# Patient Record
Sex: MALE | Race: BLACK OR AFRICAN AMERICAN | Employment: OTHER | URBAN - METROPOLITAN AREA
[De-identification: names, ages, dates, MRNs, and addresses within clinical notes are randomized per-mention and may not be internally consistent; named-entity substitution may affect disease eponyms.]

---

## 2024-11-06 ENCOUNTER — APPOINTMENT (OUTPATIENT)
Dept: GENERAL RADIOLOGY | Age: 64
DRG: 482 | End: 2024-11-06
Payer: OTHER GOVERNMENT

## 2024-11-06 ENCOUNTER — ANESTHESIA EVENT (OUTPATIENT)
Dept: OPERATING ROOM | Age: 64
End: 2024-11-06
Payer: OTHER GOVERNMENT

## 2024-11-06 ENCOUNTER — HOSPITAL ENCOUNTER (INPATIENT)
Age: 64
LOS: 5 days | Discharge: LEFT BEFORE DISCHARGE | DRG: 482 | End: 2024-11-11
Attending: EMERGENCY MEDICINE | Admitting: FAMILY MEDICINE
Payer: OTHER GOVERNMENT

## 2024-11-06 DIAGNOSIS — S72.092A: Primary | ICD-10-CM

## 2024-11-06 DIAGNOSIS — S72.092A: ICD-10-CM

## 2024-11-06 LAB
ABO + RH BLD: NORMAL
ANION GAP SERPL CALCULATED.3IONS-SCNC: 13 MMOL/L (ref 7–16)
ARM BAND NUMBER: NORMAL
BLOOD BANK SAMPLE EXPIRATION: NORMAL
BLOOD GROUP ANTIBODIES SERPL: NEGATIVE
BUN SERPL-MCNC: 10 MG/DL (ref 6–23)
CALCIUM SERPL-MCNC: 9.1 MG/DL (ref 8.6–10.2)
CHLORIDE SERPL-SCNC: 102 MMOL/L (ref 98–107)
CO2 SERPL-SCNC: 23 MMOL/L (ref 22–29)
CREAT SERPL-MCNC: 0.8 MG/DL (ref 0.7–1.2)
EKG ATRIAL RATE: 83 BPM
EKG P AXIS: 76 DEGREES
EKG P-R INTERVAL: 196 MS
EKG Q-T INTERVAL: 386 MS
EKG QRS DURATION: 80 MS
EKG QTC CALCULATION (BAZETT): 453 MS
EKG R AXIS: 70 DEGREES
EKG T AXIS: 34 DEGREES
EKG VENTRICULAR RATE: 83 BPM
ERYTHROCYTE [DISTWIDTH] IN BLOOD BY AUTOMATED COUNT: 14.3 % (ref 11.5–15)
GFR, ESTIMATED: >90 ML/MIN/1.73M2
GLUCOSE SERPL-MCNC: 105 MG/DL (ref 74–99)
HCT VFR BLD AUTO: 40.8 % (ref 37–54)
HGB BLD-MCNC: 13.2 G/DL (ref 12.5–16.5)
INR PPP: 1.2
MCH RBC QN AUTO: 27.6 PG (ref 26–35)
MCHC RBC AUTO-ENTMCNC: 32.4 G/DL (ref 32–34.5)
MCV RBC AUTO: 85.2 FL (ref 80–99.9)
PLATELET # BLD AUTO: 219 K/UL (ref 130–450)
PMV BLD AUTO: 10.5 FL (ref 7–12)
POTASSIUM SERPL-SCNC: 4.5 MMOL/L (ref 3.5–5)
PROTHROMBIN TIME: 12.9 SEC (ref 9.3–12.4)
RBC # BLD AUTO: 4.79 M/UL (ref 3.8–5.8)
SODIUM SERPL-SCNC: 138 MMOL/L (ref 132–146)
TROPONIN I SERPL HS-MCNC: 8 NG/L (ref 0–11)
WBC OTHER # BLD: 8.1 K/UL (ref 4.5–11.5)

## 2024-11-06 PROCEDURE — 73502 X-RAY EXAM HIP UNI 2-3 VIEWS: CPT

## 2024-11-06 PROCEDURE — 84484 ASSAY OF TROPONIN QUANT: CPT

## 2024-11-06 PROCEDURE — 80048 BASIC METABOLIC PNL TOTAL CA: CPT

## 2024-11-06 PROCEDURE — 85610 PROTHROMBIN TIME: CPT

## 2024-11-06 PROCEDURE — 96374 THER/PROPH/DIAG INJ IV PUSH: CPT

## 2024-11-06 PROCEDURE — 85027 COMPLETE CBC AUTOMATED: CPT

## 2024-11-06 PROCEDURE — 6360000002 HC RX W HCPCS: Performed by: EMERGENCY MEDICINE

## 2024-11-06 PROCEDURE — 86850 RBC ANTIBODY SCREEN: CPT

## 2024-11-06 PROCEDURE — 1200000000 HC SEMI PRIVATE

## 2024-11-06 PROCEDURE — 93010 ELECTROCARDIOGRAM REPORT: CPT | Performed by: INTERNAL MEDICINE

## 2024-11-06 PROCEDURE — 93005 ELECTROCARDIOGRAM TRACING: CPT | Performed by: EMERGENCY MEDICINE

## 2024-11-06 PROCEDURE — 36415 COLL VENOUS BLD VENIPUNCTURE: CPT

## 2024-11-06 PROCEDURE — 86901 BLOOD TYPING SEROLOGIC RH(D): CPT

## 2024-11-06 PROCEDURE — 99285 EMERGENCY DEPT VISIT HI MDM: CPT

## 2024-11-06 PROCEDURE — 6370000000 HC RX 637 (ALT 250 FOR IP): Performed by: EMERGENCY MEDICINE

## 2024-11-06 PROCEDURE — 6360000002 HC RX W HCPCS: Performed by: FAMILY MEDICINE

## 2024-11-06 PROCEDURE — 86900 BLOOD TYPING SEROLOGIC ABO: CPT

## 2024-11-06 PROCEDURE — 71045 X-RAY EXAM CHEST 1 VIEW: CPT

## 2024-11-06 PROCEDURE — 2580000003 HC RX 258: Performed by: FAMILY MEDICINE

## 2024-11-06 RX ORDER — POTASSIUM CHLORIDE 1500 MG/1
40 TABLET, EXTENDED RELEASE ORAL PRN
Status: ACTIVE | OUTPATIENT
Start: 2024-11-06 | End: 2024-11-08

## 2024-11-06 RX ORDER — SENNOSIDES A AND B 8.6 MG/1
1 TABLET, FILM COATED ORAL DAILY PRN
Status: DISCONTINUED | OUTPATIENT
Start: 2024-11-06 | End: 2024-11-11 | Stop reason: HOSPADM

## 2024-11-06 RX ORDER — FENTANYL CITRATE 0.05 MG/ML
25 INJECTION, SOLUTION INTRAMUSCULAR; INTRAVENOUS ONCE
Status: COMPLETED | OUTPATIENT
Start: 2024-11-06 | End: 2024-11-06

## 2024-11-06 RX ORDER — SODIUM CHLORIDE 0.9 % (FLUSH) 0.9 %
5-40 SYRINGE (ML) INJECTION EVERY 12 HOURS SCHEDULED
Status: DISCONTINUED | OUTPATIENT
Start: 2024-11-06 | End: 2024-11-11 | Stop reason: HOSPADM

## 2024-11-06 RX ORDER — ONDANSETRON 2 MG/ML
4 INJECTION INTRAMUSCULAR; INTRAVENOUS EVERY 6 HOURS PRN
Status: DISCONTINUED | OUTPATIENT
Start: 2024-11-06 | End: 2024-11-11 | Stop reason: HOSPADM

## 2024-11-06 RX ORDER — MAGNESIUM SULFATE IN WATER 40 MG/ML
2000 INJECTION, SOLUTION INTRAVENOUS PRN
Status: DISCONTINUED | OUTPATIENT
Start: 2024-11-06 | End: 2024-11-11 | Stop reason: HOSPADM

## 2024-11-06 RX ORDER — SODIUM CHLORIDE, SODIUM LACTATE, POTASSIUM CHLORIDE, CALCIUM CHLORIDE 600; 310; 30; 20 MG/100ML; MG/100ML; MG/100ML; MG/100ML
INJECTION, SOLUTION INTRAVENOUS CONTINUOUS
Status: DISCONTINUED | OUTPATIENT
Start: 2024-11-06 | End: 2024-11-07

## 2024-11-06 RX ORDER — PROMETHAZINE HYDROCHLORIDE 25 MG/1
12.5 TABLET ORAL EVERY 6 HOURS PRN
Status: DISCONTINUED | OUTPATIENT
Start: 2024-11-06 | End: 2024-11-11 | Stop reason: HOSPADM

## 2024-11-06 RX ORDER — POLYETHYLENE GLYCOL 3350 17 G/17G
17 POWDER, FOR SOLUTION ORAL DAILY PRN
Status: DISCONTINUED | OUTPATIENT
Start: 2024-11-06 | End: 2024-11-11 | Stop reason: HOSPADM

## 2024-11-06 RX ORDER — SODIUM CHLORIDE 0.9 % (FLUSH) 0.9 %
5-40 SYRINGE (ML) INJECTION PRN
Status: DISCONTINUED | OUTPATIENT
Start: 2024-11-06 | End: 2024-11-11 | Stop reason: HOSPADM

## 2024-11-06 RX ORDER — HYDROCODONE BITARTRATE AND ACETAMINOPHEN 5; 325 MG/1; MG/1
1 TABLET ORAL ONCE
Status: COMPLETED | OUTPATIENT
Start: 2024-11-06 | End: 2024-11-06

## 2024-11-06 RX ORDER — HYDROMORPHONE HYDROCHLORIDE 1 MG/ML
1 INJECTION, SOLUTION INTRAMUSCULAR; INTRAVENOUS; SUBCUTANEOUS
Status: DISCONTINUED | OUTPATIENT
Start: 2024-11-06 | End: 2024-11-07

## 2024-11-06 RX ORDER — POTASSIUM CHLORIDE 7.45 MG/ML
10 INJECTION INTRAVENOUS PRN
Status: ACTIVE | OUTPATIENT
Start: 2024-11-06 | End: 2024-11-08

## 2024-11-06 RX ORDER — IPRATROPIUM BROMIDE AND ALBUTEROL SULFATE 2.5; .5 MG/3ML; MG/3ML
1 SOLUTION RESPIRATORY (INHALATION) EVERY 4 HOURS PRN
Status: DISCONTINUED | OUTPATIENT
Start: 2024-11-06 | End: 2024-11-11 | Stop reason: HOSPADM

## 2024-11-06 RX ORDER — SODIUM CHLORIDE 9 MG/ML
INJECTION, SOLUTION INTRAVENOUS PRN
Status: DISCONTINUED | OUTPATIENT
Start: 2024-11-06 | End: 2024-11-11 | Stop reason: HOSPADM

## 2024-11-06 RX ORDER — MORPHINE SULFATE 2 MG/ML
2 INJECTION, SOLUTION INTRAMUSCULAR; INTRAVENOUS
Status: DISCONTINUED | OUTPATIENT
Start: 2024-11-06 | End: 2024-11-11 | Stop reason: HOSPADM

## 2024-11-06 RX ADMIN — HYDROCODONE BITARTRATE AND ACETAMINOPHEN 1 TABLET: 5; 325 TABLET ORAL at 13:10

## 2024-11-06 RX ADMIN — HYDROMORPHONE HYDROCHLORIDE 1 MG: 1 INJECTION, SOLUTION INTRAMUSCULAR; INTRAVENOUS; SUBCUTANEOUS at 17:35

## 2024-11-06 RX ADMIN — FENTANYL CITRATE 25 MCG: 0.05 INJECTION, SOLUTION INTRAMUSCULAR; INTRAVENOUS at 14:47

## 2024-11-06 RX ADMIN — SODIUM CHLORIDE, PRESERVATIVE FREE 10 ML: 5 INJECTION INTRAVENOUS at 22:41

## 2024-11-06 RX ADMIN — MORPHINE SULFATE 2 MG: 2 INJECTION, SOLUTION INTRAMUSCULAR; INTRAVENOUS at 16:04

## 2024-11-06 RX ADMIN — SODIUM CHLORIDE, POTASSIUM CHLORIDE, SODIUM LACTATE AND CALCIUM CHLORIDE: 600; 310; 30; 20 INJECTION, SOLUTION INTRAVENOUS at 16:04

## 2024-11-06 ASSESSMENT — PAIN DESCRIPTION - DESCRIPTORS
DESCRIPTORS: SHARP
DESCRIPTORS: ACHING;SHARP

## 2024-11-06 ASSESSMENT — PAIN SCALES - GENERAL
PAINLEVEL_OUTOF10: 10

## 2024-11-06 ASSESSMENT — PAIN DESCRIPTION - ORIENTATION
ORIENTATION: LEFT
ORIENTATION: LEFT

## 2024-11-06 ASSESSMENT — LIFESTYLE VARIABLES
HOW OFTEN DO YOU HAVE A DRINK CONTAINING ALCOHOL: NEVER
HOW MANY STANDARD DRINKS CONTAINING ALCOHOL DO YOU HAVE ON A TYPICAL DAY: PATIENT DOES NOT DRINK

## 2024-11-06 ASSESSMENT — PAIN DESCRIPTION - LOCATION: LOCATION: HIP

## 2024-11-06 NOTE — ED PROVIDER NOTES
Patient presents with complaint of left hip pain after falling from a ladder.  He states was about 10 ft up and the ladder slipped.  He rode it down and fell onto a wood deck.  He denies striking his head.  No LOC.  He denies use of anticoagulation, bleeding or clotting disorder.  He denies CP, SOB or abdominal pain.    The history is provided by the patient.       Review of Systems   Constitutional:  Positive for activity change.   HENT:  Negative for facial swelling.    Respiratory:  Negative for shortness of breath.    Cardiovascular:  Negative for chest pain.   Gastrointestinal:  Negative for abdominal pain and nausea.   Genitourinary:  Negative for flank pain.   Musculoskeletal:  Positive for arthralgias and gait problem. Negative for back pain, myalgias and neck pain.   Skin: Negative.    Neurological:  Negative for dizziness, syncope, light-headedness, numbness and headaches.   Hematological:  Does not bruise/bleed easily.       Physical Exam  Vitals and nursing note reviewed.   Constitutional:       General: He is not in acute distress.     Appearance: Normal appearance. He is well-developed and normal weight. He is not ill-appearing, toxic-appearing or diaphoretic.   HENT:      Head: Normocephalic and atraumatic.      Nose: Nose normal.      Mouth/Throat:      Mouth: Mucous membranes are moist.      Pharynx: Oropharynx is clear.   Eyes:      Extraocular Movements: Extraocular movements intact.      Conjunctiva/sclera: Conjunctivae normal.      Pupils: Pupils are equal, round, and reactive to light.   Cardiovascular:      Rate and Rhythm: Normal rate and regular rhythm.      Pulses: Normal pulses.      Heart sounds: Normal heart sounds. No murmur heard.  Pulmonary:      Effort: Pulmonary effort is normal. No respiratory distress.      Breath sounds: Normal breath sounds. No wheezing or rales.   Abdominal:      General: Bowel sounds are normal.      Palpations: Abdomen is soft.      Tenderness: There is no

## 2024-11-06 NOTE — CONSULTS
thigh, hip.    Pelvis: -TTP, -Log roll, -Heel strike     DATA:    CBC:   Lab Results   Component Value Date/Time    WBC 8.1 11/06/2024 02:10 PM    RBC 4.79 11/06/2024 02:10 PM    HGB 13.2 11/06/2024 02:10 PM    HCT 40.8 11/06/2024 02:10 PM    MCV 85.2 11/06/2024 02:10 PM    MCH 27.6 11/06/2024 02:10 PM    MCHC 32.4 11/06/2024 02:10 PM    RDW 14.3 11/06/2024 02:10 PM     11/06/2024 02:10 PM    MPV 10.5 11/06/2024 02:10 PM       Radiology Review:  XR left hip and pelvis 11/6/2024  Radiographs of left hip and pelvis demonstrating left comminuted intertrochanteric hip fracture with possible basicervical femoral neck fracture.  Will appear shortened and externally rotated.  Mild apex anterior angulation without varus or valgus deformity.  No other fractures or dislocations.    IMPRESSION:  Left comminuted intertrochanteric hip fracture    PLAN:  Patient admitted to medicine service  Pain control per admitting team  We will plan for open reduction internal fixation of left intertrochanteric hip fracture on 11/7/2024  Patient be n.p.o. at midnight 11/7/2024  Preoperative antibiotics Ancef 11/7/2024  Appreciate medical optimization from medical team  Discussed risk and benefits of open reduction internal fixation procedure with patient.  All patient's questions were sought and answered and patient is currently agreeable to plan  All patient/family questions have been answered and patient is currently agreeable to plan.  Discuss with Attending Dr. Mix    I was present with the resident during the history and exam. I discussed the case with the resident and agree with the findings and plan as documented in the resident's note.     Electronically signed by Murali Trivedi DO on 11/6/2024 at 3:21 PM    
XRAY VIEWS LEFT HIP 11/6/2024 1:28 pm COMPARISON: None. HISTORY: ORDERING SYSTEM PROVIDED HISTORY: fall TECHNOLOGIST PROVIDED HISTORY: Reason for exam:->fall FINDINGS: There is comminuted intertrochanteric fracture.  Adjacent soft tissue swelling noted.  There are no radiopaque foreign bodies.     Comminuted left intertrochanteric fracture.         NOTE: This report, in part or full, may have been transcribed using voice recognition software. Every effort was made to ensure accuracy; however, inadvertent computerized transcription errors may be present. Please excuse any transcriptional grammatical or spelling errors that may have escaped my editorial review.    Physician Signature: Electronically signed by Dr. Juarez  [phone] (p)  11/7/2024  2:51 PM

## 2024-11-06 NOTE — H&P
History & Physicial  Taras Benson  [id number]  1960 11/06/24  Primary Care:  No primary care provider on file.  No primary physician on file.        Chief Complaint   Patient presents with    Fall     Fell off ladder 30mins PTA while cleaning gutters, landed on left side, denies hitting head, denies LOC, denies thinners, reports left hip/leg pain       HPI:  Patient is a 64-year-old male who presents from home to the ER after falling off a ladder while cleaning the gutters and landing on his left side.  He denies hitting his head, denies loss of consciousness.  He is not on any blood thinners or any chronic medications and does not follow with a primary doctor regularly.  He had immediate pain in his left hip and was unable to ambulate.  Glucose 105.  Otherwise metabolic panel negative.  CBC unremarkable.  INR 1.2.  Type and screen was ordered.  X-ray of the left hip showed a comminuted left intertrochanteric fracture.  EKG showed normal sinus rhythm.  Consult was placed to the surgical team for trauma as well as orthopedic surgery for the left hip fracture.  The patient was admitted for further workup, evaluation, and management.    Prior to Visit Medications    Not on File     Social History     Tobacco Use    Smoking status: Every Day     Types: Cigars    Smokeless tobacco: Never   Vaping Use    Vaping status: Never Used   Substance Use Topics    Alcohol use: Not Currently     History reviewed. No pertinent family history.  History reviewed. No pertinent surgical history.  History reviewed. No pertinent past medical history.    Review of Systems   Constitutional: Negative.    HENT: Negative.     Eyes: Negative.    Respiratory: Negative.     Cardiovascular: Negative.    Gastrointestinal: Negative.    Endocrine: Negative.    Musculoskeletal:  Positive for arthralgias.        Left hip pain   Skin: Negative.    Allergic/Immunologic: Negative.    Neurological: Negative.    Hematological: Negative.

## 2024-11-07 ENCOUNTER — ANESTHESIA (OUTPATIENT)
Dept: OPERATING ROOM | Age: 64
End: 2024-11-07
Payer: OTHER GOVERNMENT

## 2024-11-07 ENCOUNTER — APPOINTMENT (OUTPATIENT)
Dept: GENERAL RADIOLOGY | Age: 64
DRG: 482 | End: 2024-11-07
Payer: OTHER GOVERNMENT
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
## 2024-11-07 LAB — PARTIAL THROMBOPLASTIN TIME: 31.9 SEC (ref 24.5–35.1)

## 2024-11-07 PROCEDURE — 85730 THROMBOPLASTIN TIME PARTIAL: CPT

## 2024-11-07 PROCEDURE — 1200000000 HC SEMI PRIVATE

## 2024-11-07 PROCEDURE — 3600000004 HC SURGERY LEVEL 4 BASE: Performed by: ORTHOPAEDIC SURGERY

## 2024-11-07 PROCEDURE — 3700000000 HC ANESTHESIA ATTENDED CARE: Performed by: ORTHOPAEDIC SURGERY

## 2024-11-07 PROCEDURE — 0QS704Z REPOSITION LEFT UPPER FEMUR WITH INTERNAL FIXATION DEVICE, OPEN APPROACH: ICD-10-PCS | Performed by: ORTHOPAEDIC SURGERY

## 2024-11-07 PROCEDURE — 3700000001 HC ADD 15 MINUTES (ANESTHESIA): Performed by: ORTHOPAEDIC SURGERY

## 2024-11-07 PROCEDURE — 6360000002 HC RX W HCPCS: Performed by: ANESTHESIOLOGY

## 2024-11-07 PROCEDURE — 36415 COLL VENOUS BLD VENIPUNCTURE: CPT

## 2024-11-07 PROCEDURE — 7100000000 HC PACU RECOVERY - FIRST 15 MIN: Performed by: ORTHOPAEDIC SURGERY

## 2024-11-07 PROCEDURE — C1713 ANCHOR/SCREW BN/BN,TIS/BN: HCPCS | Performed by: ORTHOPAEDIC SURGERY

## 2024-11-07 PROCEDURE — 2500000003 HC RX 250 WO HCPCS

## 2024-11-07 PROCEDURE — 7100000001 HC PACU RECOVERY - ADDTL 15 MIN: Performed by: ORTHOPAEDIC SURGERY

## 2024-11-07 PROCEDURE — 2580000003 HC RX 258

## 2024-11-07 PROCEDURE — 3600000014 HC SURGERY LEVEL 4 ADDTL 15MIN: Performed by: ORTHOPAEDIC SURGERY

## 2024-11-07 PROCEDURE — 6360000002 HC RX W HCPCS

## 2024-11-07 PROCEDURE — C1769 GUIDE WIRE: HCPCS | Performed by: ORTHOPAEDIC SURGERY

## 2024-11-07 PROCEDURE — 2709999900 HC NON-CHARGEABLE SUPPLY: Performed by: ORTHOPAEDIC SURGERY

## 2024-11-07 PROCEDURE — 2720000010 HC SURG SUPPLY STERILE: Performed by: ORTHOPAEDIC SURGERY

## 2024-11-07 PROCEDURE — 6370000000 HC RX 637 (ALT 250 FOR IP): Performed by: INTERNAL MEDICINE

## 2024-11-07 PROCEDURE — 6360000002 HC RX W HCPCS: Performed by: INTERNAL MEDICINE

## 2024-11-07 DEVICE — LONG NAIL, LEFT
Type: IMPLANTABLE DEVICE | Site: HIP | Status: FUNCTIONAL
Brand: GAMMA

## 2024-11-07 DEVICE — LOCKING SCREW
Type: IMPLANTABLE DEVICE | Site: HIP | Status: FUNCTIONAL
Brand: T2 ALPHA

## 2024-11-07 DEVICE — LAG SCREW
Type: IMPLANTABLE DEVICE | Site: HIP | Status: FUNCTIONAL
Brand: GAMMA

## 2024-11-07 RX ORDER — PHENYLEPHRINE HCL IN 0.9% NACL 1 MG/10 ML
SYRINGE (ML) INTRAVENOUS
Status: DISCONTINUED | OUTPATIENT
Start: 2024-11-07 | End: 2024-11-07 | Stop reason: SDUPTHER

## 2024-11-07 RX ORDER — ASPIRIN 325 MG
325 TABLET ORAL 2 TIMES DAILY
Qty: 56 TABLET | Refills: 0 | Status: SHIPPED | OUTPATIENT
Start: 2024-11-07 | End: 2024-12-05

## 2024-11-07 RX ORDER — ENOXAPARIN SODIUM 100 MG/ML
40 INJECTION SUBCUTANEOUS DAILY
Status: DISCONTINUED | OUTPATIENT
Start: 2024-11-08 | End: 2024-11-11 | Stop reason: HOSPADM

## 2024-11-07 RX ORDER — SODIUM CHLORIDE 9 MG/ML
INJECTION, SOLUTION INTRAVENOUS PRN
Status: DISCONTINUED | OUTPATIENT
Start: 2024-11-07 | End: 2024-11-07 | Stop reason: HOSPADM

## 2024-11-07 RX ORDER — OXYCODONE AND ACETAMINOPHEN 5; 325 MG/1; MG/1
1 TABLET ORAL EVERY 6 HOURS PRN
Qty: 28 TABLET | Refills: 0 | Status: SHIPPED | OUTPATIENT
Start: 2024-11-07 | End: 2024-11-14

## 2024-11-07 RX ORDER — DEXMEDETOMIDINE HYDROCHLORIDE 100 UG/ML
INJECTION, SOLUTION INTRAVENOUS
Status: DISCONTINUED | OUTPATIENT
Start: 2024-11-07 | End: 2024-11-07 | Stop reason: SDUPTHER

## 2024-11-07 RX ORDER — ACETAMINOPHEN 500 MG
1000 TABLET ORAL EVERY 6 HOURS SCHEDULED
Status: DISPENSED | OUTPATIENT
Start: 2024-11-07 | End: 2024-11-09

## 2024-11-07 RX ORDER — MIDAZOLAM HYDROCHLORIDE 1 MG/ML
2 INJECTION, SOLUTION INTRAMUSCULAR; INTRAVENOUS
Status: DISCONTINUED | OUTPATIENT
Start: 2024-11-07 | End: 2024-11-07 | Stop reason: HOSPADM

## 2024-11-07 RX ORDER — FENTANYL CITRATE 50 UG/ML
INJECTION, SOLUTION INTRAMUSCULAR; INTRAVENOUS
Status: COMPLETED | OUTPATIENT
Start: 2024-11-07 | End: 2024-11-07

## 2024-11-07 RX ORDER — MIDAZOLAM HYDROCHLORIDE 1 MG/ML
INJECTION, SOLUTION INTRAMUSCULAR; INTRAVENOUS
Status: DISCONTINUED | OUTPATIENT
Start: 2024-11-07 | End: 2024-11-07 | Stop reason: SDUPTHER

## 2024-11-07 RX ORDER — LABETALOL HYDROCHLORIDE 5 MG/ML
10 INJECTION, SOLUTION INTRAVENOUS
Status: DISCONTINUED | OUTPATIENT
Start: 2024-11-07 | End: 2024-11-07 | Stop reason: HOSPADM

## 2024-11-07 RX ORDER — PHENYLEPHRINE HYDROCHLORIDE 10 MG/ML
INJECTION INTRAVENOUS
Status: DISCONTINUED | OUTPATIENT
Start: 2024-11-07 | End: 2024-11-07

## 2024-11-07 RX ORDER — PROCHLORPERAZINE EDISYLATE 5 MG/ML
5 INJECTION INTRAMUSCULAR; INTRAVENOUS
Status: DISCONTINUED | OUTPATIENT
Start: 2024-11-07 | End: 2024-11-07 | Stop reason: HOSPADM

## 2024-11-07 RX ORDER — SODIUM CHLORIDE 0.9 % (FLUSH) 0.9 %
5-40 SYRINGE (ML) INJECTION PRN
Status: DISCONTINUED | OUTPATIENT
Start: 2024-11-07 | End: 2024-11-07 | Stop reason: HOSPADM

## 2024-11-07 RX ORDER — BUPIVACAINE HYDROCHLORIDE 7.5 MG/ML
INJECTION, SOLUTION INTRASPINAL
Status: COMPLETED | OUTPATIENT
Start: 2024-11-07 | End: 2024-11-07

## 2024-11-07 RX ORDER — FENTANYL CITRATE 0.05 MG/ML
25 INJECTION, SOLUTION INTRAMUSCULAR; INTRAVENOUS EVERY 5 MIN PRN
Status: DISCONTINUED | OUTPATIENT
Start: 2024-11-07 | End: 2024-11-07 | Stop reason: HOSPADM

## 2024-11-07 RX ORDER — KETOROLAC TROMETHAMINE 30 MG/ML
30 INJECTION, SOLUTION INTRAMUSCULAR; INTRAVENOUS ONCE
Status: COMPLETED | OUTPATIENT
Start: 2024-11-07 | End: 2024-11-07

## 2024-11-07 RX ORDER — KETOROLAC TROMETHAMINE 30 MG/ML
30 INJECTION, SOLUTION INTRAMUSCULAR; INTRAVENOUS EVERY 6 HOURS PRN
Status: DISPENSED | OUTPATIENT
Start: 2024-11-07 | End: 2024-11-09

## 2024-11-07 RX ORDER — KETOROLAC TROMETHAMINE 30 MG/ML
INJECTION, SOLUTION INTRAMUSCULAR; INTRAVENOUS
Status: COMPLETED
Start: 2024-11-07 | End: 2024-11-07

## 2024-11-07 RX ORDER — SODIUM CHLORIDE 0.9 % (FLUSH) 0.9 %
5-40 SYRINGE (ML) INJECTION EVERY 12 HOURS SCHEDULED
Status: DISCONTINUED | OUTPATIENT
Start: 2024-11-07 | End: 2024-11-07 | Stop reason: HOSPADM

## 2024-11-07 RX ORDER — DROPERIDOL 2.5 MG/ML
0.62 INJECTION, SOLUTION INTRAMUSCULAR; INTRAVENOUS
Status: DISCONTINUED | OUTPATIENT
Start: 2024-11-07 | End: 2024-11-07 | Stop reason: HOSPADM

## 2024-11-07 RX ORDER — DIPHENHYDRAMINE HYDROCHLORIDE 50 MG/ML
12.5 INJECTION INTRAMUSCULAR; INTRAVENOUS
Status: DISCONTINUED | OUTPATIENT
Start: 2024-11-07 | End: 2024-11-07 | Stop reason: HOSPADM

## 2024-11-07 RX ORDER — HYDRALAZINE HYDROCHLORIDE 20 MG/ML
10 INJECTION INTRAMUSCULAR; INTRAVENOUS
Status: DISCONTINUED | OUTPATIENT
Start: 2024-11-07 | End: 2024-11-07 | Stop reason: HOSPADM

## 2024-11-07 RX ORDER — MEPERIDINE HYDROCHLORIDE 25 MG/ML
12.5 INJECTION INTRAMUSCULAR; INTRAVENOUS; SUBCUTANEOUS EVERY 5 MIN PRN
Status: DISCONTINUED | OUTPATIENT
Start: 2024-11-07 | End: 2024-11-07 | Stop reason: HOSPADM

## 2024-11-07 RX ORDER — ASPIRIN 325 MG
325 TABLET, DELAYED RELEASE (ENTERIC COATED) ORAL 2 TIMES DAILY
Status: DISCONTINUED | OUTPATIENT
Start: 2024-11-08 | End: 2024-11-11 | Stop reason: HOSPADM

## 2024-11-07 RX ORDER — PROPOFOL 10 MG/ML
INJECTION, EMULSION INTRAVENOUS
Status: DISCONTINUED | OUTPATIENT
Start: 2024-11-07 | End: 2024-11-07 | Stop reason: SDUPTHER

## 2024-11-07 RX ORDER — IPRATROPIUM BROMIDE AND ALBUTEROL SULFATE 2.5; .5 MG/3ML; MG/3ML
1 SOLUTION RESPIRATORY (INHALATION)
Status: DISCONTINUED | OUTPATIENT
Start: 2024-11-07 | End: 2024-11-07 | Stop reason: HOSPADM

## 2024-11-07 RX ORDER — NALOXONE HYDROCHLORIDE 0.4 MG/ML
INJECTION, SOLUTION INTRAMUSCULAR; INTRAVENOUS; SUBCUTANEOUS PRN
Status: DISCONTINUED | OUTPATIENT
Start: 2024-11-07 | End: 2024-11-07 | Stop reason: HOSPADM

## 2024-11-07 RX ADMIN — CEFAZOLIN 2000 MG: 2 INJECTION, POWDER, FOR SOLUTION INTRAMUSCULAR; INTRAVENOUS at 13:44

## 2024-11-07 RX ADMIN — DEXMEDETOMIDINE HYDROCHLORIDE 6 MCG: 100 INJECTION, SOLUTION INTRAVENOUS at 13:48

## 2024-11-07 RX ADMIN — DEXMEDETOMIDINE HYDROCHLORIDE 6 MCG: 100 INJECTION, SOLUTION INTRAVENOUS at 13:30

## 2024-11-07 RX ADMIN — PROPOFOL INJECTABLE EMULSION 80 MCG/KG/MIN: 10 INJECTION, EMULSION INTRAVENOUS at 13:30

## 2024-11-07 RX ADMIN — KETOROLAC TROMETHAMINE 30 MG: 30 INJECTION, SOLUTION INTRAMUSCULAR at 22:08

## 2024-11-07 RX ADMIN — Medication 100 MCG: at 14:10

## 2024-11-07 RX ADMIN — KETOROLAC TROMETHAMINE 30 MG: 30 INJECTION, SOLUTION INTRAMUSCULAR at 16:20

## 2024-11-07 RX ADMIN — MIDAZOLAM 2 MG: 1 INJECTION INTRAMUSCULAR; INTRAVENOUS at 13:28

## 2024-11-07 RX ADMIN — FENTANYL CITRATE 25 MCG: 50 INJECTION, SOLUTION INTRAMUSCULAR; INTRAVENOUS at 13:42

## 2024-11-07 RX ADMIN — FENTANYL CITRATE 25 MCG: 50 INJECTION, SOLUTION INTRAMUSCULAR; INTRAVENOUS at 13:36

## 2024-11-07 RX ADMIN — Medication 100 MCG: at 13:58

## 2024-11-07 RX ADMIN — PHENYLEPHRINE HYDROCHLORIDE 100 MCG/MIN: 50 INJECTION INTRAVENOUS at 14:22

## 2024-11-07 RX ADMIN — Medication 100 MCG: at 14:24

## 2024-11-07 RX ADMIN — ACETAMINOPHEN 1000 MG: 500 TABLET ORAL at 18:46

## 2024-11-07 RX ADMIN — PROPOFOL INJECTABLE EMULSION 40 MG: 10 INJECTION, EMULSION INTRAVENOUS at 14:18

## 2024-11-07 RX ADMIN — Medication 100 MCG: at 14:03

## 2024-11-07 RX ADMIN — Medication 100 MCG: at 14:21

## 2024-11-07 RX ADMIN — Medication 300 MCG: at 14:12

## 2024-11-07 RX ADMIN — Medication 200 MCG: at 14:31

## 2024-11-07 RX ADMIN — BUPIVACAINE HYDROCHLORIDE IN DEXTROSE 12 MG: 7.5 INJECTION, SOLUTION SUBARACHNOID at 13:42

## 2024-11-07 RX ADMIN — FENTANYL CITRATE 50 MCG: 50 INJECTION, SOLUTION INTRAMUSCULAR; INTRAVENOUS at 13:30

## 2024-11-07 RX ADMIN — Medication 100 MCG: at 14:08

## 2024-11-07 RX ADMIN — KETOROLAC TROMETHAMINE 30 MG: 30 INJECTION, SOLUTION INTRAMUSCULAR; INTRAVENOUS at 16:20

## 2024-11-07 RX ADMIN — CEFAZOLIN 2000 MG: 2 INJECTION, POWDER, FOR SOLUTION INTRAMUSCULAR; INTRAVENOUS at 20:15

## 2024-11-07 ASSESSMENT — PAIN DESCRIPTION - DESCRIPTORS
DESCRIPTORS: ACHING
DESCRIPTORS: ACHING;DISCOMFORT;SORE
DESCRIPTORS: ACHING;DISCOMFORT;SORE

## 2024-11-07 ASSESSMENT — LIFESTYLE VARIABLES: SMOKING_STATUS: 1

## 2024-11-07 ASSESSMENT — PAIN DESCRIPTION - PAIN TYPE
TYPE: SURGICAL PAIN
TYPE: SURGICAL PAIN

## 2024-11-07 ASSESSMENT — PAIN DESCRIPTION - FREQUENCY
FREQUENCY: CONTINUOUS
FREQUENCY: CONTINUOUS

## 2024-11-07 ASSESSMENT — PAIN DESCRIPTION - LOCATION
LOCATION: HIP
LOCATION: NECK
LOCATION: HIP

## 2024-11-07 ASSESSMENT — PAIN - FUNCTIONAL ASSESSMENT
PAIN_FUNCTIONAL_ASSESSMENT: PREVENTS OR INTERFERES SOME ACTIVE ACTIVITIES AND ADLS
PAIN_FUNCTIONAL_ASSESSMENT: ACTIVITIES ARE NOT PREVENTED
PAIN_FUNCTIONAL_ASSESSMENT: NONE - DENIES PAIN
PAIN_FUNCTIONAL_ASSESSMENT: PREVENTS OR INTERFERES SOME ACTIVE ACTIVITIES AND ADLS
PAIN_FUNCTIONAL_ASSESSMENT: NONE - DENIES PAIN

## 2024-11-07 ASSESSMENT — PAIN SCALES - GENERAL
PAINLEVEL_OUTOF10: 5
PAINLEVEL_OUTOF10: 7
PAINLEVEL_OUTOF10: 2

## 2024-11-07 ASSESSMENT — PAIN DESCRIPTION - ONSET
ONSET: ON-GOING
ONSET: ON-GOING

## 2024-11-07 ASSESSMENT — PAIN DESCRIPTION - ORIENTATION
ORIENTATION: LEFT
ORIENTATION: LEFT
ORIENTATION: RIGHT

## 2024-11-07 NOTE — OP NOTE
Operative Note      Patient: Taras Benson  YOB: 1960  MRN: [id number]    Date of Procedure: 11/7/2024    Pre-Op Diagnosis Codes:      * Closed intertrochanteric fracture of hip, left, initial encounter ([facility]) [S72.142A]    Post-Op Diagnosis: Same       Procedure(s):  OPEN REDUCTION INTERNAL FIXATION LEFT INTERTROCHANTERIC HIP FRACTURE    Surgeon(s):  Eron Mix DO    Assistant:   First Assistant: Maico Figueroa  Resident: Murali Trivedi DO; Win Rosa DO    Anesthesia: Spinal    Estimated Blood Loss (mL): less than 100     Complications: None    Specimens:   * No specimens in log *    Implants:  Implant Name Type Inv. Item Serial No.  Lot No. LRB No. Used Action   NAIL  DEG L 420 MM DIA12 MM LNG TI ALLOY LT HIP STRL - [id number]  NAIL  DEG L 420 MM DIA12 MM LNG TI ALLOY LT HIP STRL  [employer] DFM [id number] Left 1 Implanted   SCREW LK 5X45MM - [id number]  SCREW LK 5X45MM  [employer] Oy-WD [id number] Left 1 Implanted         Drains: * No LDAs found *    Findings:  Infection Present At Time Of Surgery (PATOS) (choose all levels that have infection present):  No infection present  Other Findings: as above    Detailed Description of Procedure:   Below    OPERATIVE COURSE:      The patient was brought into the operating room in a supine position on a hospital room bed. The patient underwent spinal anesthesia.  He was then transferred to the fracture table, by multiple individuals, in a safe fashion with anesthesia in control of the patient's cervical spine and airway. Once on the fracture table, a peroneal post was placed. All points of pressure were identified and well padded and legs were placed in well-padded boots.     After being positioned, a time out was performed indicating the appropriate identification of the patient, the procedure to be performed, and the side to be performed upon. This was agreed upon by all individuals in the room.

## 2024-11-07 NOTE — PLAN OF CARE
Problem: Discharge Planning  Goal: Discharge to home or other facility with appropriate resources  Recent Flowsheet Documentation  Taken 11/6/2024 1549 by Jodie Mock, RN  Discharge to home or other facility with appropriate resources: Identify barriers to discharge with patient and caregiver  11/6/2024 1547 by Jodie Mock, RN  Outcome: Completed     Problem: Safety - Adult  Goal: Free from fall injury  11/6/2024 1547 by Jodie Mock, RN  Outcome: Completed     Problem: ABCDS Injury Assessment  Goal: Absence of physical injury  11/6/2024 1547 by Jodie Mock, RN  Outcome: Completed

## 2024-11-07 NOTE — ANESTHESIA PROCEDURE NOTES
Spinal Block    Patient location during procedure: OR  End time: 11/7/2024 1:44 PM  Reason for block: primary anesthetic and at surgeon's request  Staffing  Performed: other anesthesia staff   Anesthesiologist: Rudy Machuca MD  Resident/CRNA: Fidencio Erickson APRN - CRNA  Other anesthesia staff: Shanika Mendoza RN  Performed by: Fidencio Erickson APRN - CRNA  Authorized by: Rudy Machuca MD    Spinal Block  Patient position: left lateral decubitus  Prep: ChloraPrep  Patient monitoring: continuous pulse ox, frequent blood pressure checks, cardiac monitor, continuous capnometry and oxygen  Approach: midline  Location: L3/L4  Provider prep: mask and sterile gloves  Local infiltration: lidocaine  Needle  Needle type: Pencan   Needle gauge: 25 G  Needle length: 3.5 in  Assessment  Swirl obtained: Yes  CSF: clear  Attempts: 1  Hemodynamics: stable  Preanesthetic Checklist  Completed: patient identified, IV checked, site marked, risks and benefits discussed, surgical/procedural consents, equipment checked, pre-op evaluation, timeout performed, anesthesia consent given, oxygen available and monitors applied/VS acknowledged

## 2024-11-07 NOTE — CARE COORDINATION
11/7/2024: Pt presents from home with a left hip fracture, unable to meet with pt, currently in OR, sw/cm to follow post-op for PT/OT evals for transition of care planning needs.Electronically signed by BERE Sibley on 11/7/2024 at 1:30 PM

## 2024-11-07 NOTE — ANESTHESIA PRE PROCEDURE
ECG     Anesthesia Plan      spinal     ASA 2       Induction: intravenous.    MIPS: Prophylactic antiemetics administered.  Anesthetic plan and risks discussed with patient.    Use of blood products discussed with patient whom consented to blood products.    Plan discussed with CRNA.            General anesthesia discussed as back up.        Shanika Mendoza RN   11/7/2024

## 2024-11-07 NOTE — DISCHARGE INSTRUCTIONS
Your information:  Name: Taras Benson  : 1960    Your instructions:  Discharge home against medical advice  [facility] so we will not know until tomorrow if there is an accepting agency for you to receive physical therapy at home  Continue weight bearing as tolerated with your walker  Dressing to be removed on postoperative day 7 and then dry sterile dressing changes until drainage ceases    Signs and symptoms to watch out for:   Call 911 anytime you think you may need emergency care. For example, call if:    You passed out (lost consciousness).     You have severe trouble breathing.     You have sudden chest pain and shortness of breath, or you cough up blood.   Call your doctor now or seek immediate medical care if:    You have pain that does not get better after you take pain medicine.     Your fingers or toes on the injured arm or leg are cool, pale, or change color.     You have tingling or numbness in your fingers or toes.     You cannot move your fingers or toes.     Your cast or splint feels too tight.     The skin under your cast or splint is burning or stinging.     You have signs of infection, such as:  Increased pain, swelling, warmth, or redness.  Red streaks leading from the incision.  Pus draining from the incision.  A fever.     You have drainage or a bad smell coming from the cast or splint.     You have signs of a blood clot, such as:  Pain in your calf, back of the knee, thigh, or groin.  Swelling in the leg or groin.  A color change on the leg or groin. The skin may be reddish or purplish, depending on your usual skin color.     You have new or worse nausea or vomiting.     You are too sick to your stomach to drink any fluids.     You cannot keep down fluids.   Watch closely for any changes in your health, and be sure to contact your doctor if:    You have any problems with your cast or splint.     What to do after you leave the hospital:    Recommended diet: regular diet    The

## 2024-11-07 NOTE — ANESTHESIA POSTPROCEDURE EVALUATION
Department of Anesthesiology  Postprocedure Note    Patient: Taras Benson  MRN: [id number]  YOB: 1960  Date of evaluation: 11/7/2024    Procedure Summary       Date: 11/07/24 Room / Location: [address]    Anesthesia Start: 1304 Anesthesia Stop: 1534    Procedure: OPEN REDUCTION INTERNAL FIXATION LEFT INTERTROCHANTERIC HIP FRACTURE (Left: Hip) Diagnosis:       Closed intertrochanteric fracture of hip, left, initial encounter ([facility])      (Closed intertrochanteric fracture of hip, left, initial encounter ([facility]) [S72.142A])    Surgeons: Eron Mix DO Responsible Provider: Rudy Machuca MD    Anesthesia Type: MAC ASA Status: 2            Anesthesia Type: MAC    Cammie Phase I: Cammie Score: 10    Acmmie Phase II:      Anesthesia Post Evaluation    Patient location during evaluation: PACU  Patient participation: complete - patient participated  Level of consciousness: awake  Airway patency: patent  Nausea & Vomiting: no nausea and no vomiting  Cardiovascular status: hemodynamically stable  Respiratory status: acceptable  Hydration status: euvolemic  Pain management: adequate    No notable events documented.

## 2024-11-08 LAB
ANION GAP SERPL CALCULATED.3IONS-SCNC: 11 MMOL/L (ref 7–16)
BUN SERPL-MCNC: 16 MG/DL (ref 6–23)
CALCIUM SERPL-MCNC: 8.5 MG/DL (ref 8.6–10.2)
CHLORIDE SERPL-SCNC: 101 MMOL/L (ref 98–107)
CO2 SERPL-SCNC: 24 MMOL/L (ref 22–29)
CREAT SERPL-MCNC: 0.8 MG/DL (ref 0.7–1.2)
GFR, ESTIMATED: >90 ML/MIN/1.73M2
GLUCOSE SERPL-MCNC: 115 MG/DL (ref 74–99)
HCT VFR BLD AUTO: 32.2 % (ref 37–54)
HGB BLD-MCNC: 10.9 G/DL (ref 12.5–16.5)
POTASSIUM SERPL-SCNC: 3.9 MMOL/L (ref 3.5–5)
SODIUM SERPL-SCNC: 136 MMOL/L (ref 132–146)

## 2024-11-08 PROCEDURE — 97530 THERAPEUTIC ACTIVITIES: CPT

## 2024-11-08 PROCEDURE — 6360000002 HC RX W HCPCS: Performed by: INTERNAL MEDICINE

## 2024-11-08 PROCEDURE — 1200000000 HC SEMI PRIVATE

## 2024-11-08 PROCEDURE — 97161 PT EVAL LOW COMPLEX 20 MIN: CPT | Performed by: PHYSICAL THERAPIST

## 2024-11-08 PROCEDURE — 97110 THERAPEUTIC EXERCISES: CPT | Performed by: PHYSICAL THERAPIST

## 2024-11-08 PROCEDURE — 2580000003 HC RX 258

## 2024-11-08 PROCEDURE — 36415 COLL VENOUS BLD VENIPUNCTURE: CPT

## 2024-11-08 PROCEDURE — 6370000000 HC RX 637 (ALT 250 FOR IP): Performed by: INTERNAL MEDICINE

## 2024-11-08 PROCEDURE — 97530 THERAPEUTIC ACTIVITIES: CPT | Performed by: PHYSICAL THERAPIST

## 2024-11-08 PROCEDURE — 85014 HEMATOCRIT: CPT

## 2024-11-08 PROCEDURE — 6360000002 HC RX W HCPCS

## 2024-11-08 PROCEDURE — 97165 OT EVAL LOW COMPLEX 30 MIN: CPT

## 2024-11-08 PROCEDURE — 80048 BASIC METABOLIC PNL TOTAL CA: CPT

## 2024-11-08 PROCEDURE — 6370000000 HC RX 637 (ALT 250 FOR IP)

## 2024-11-08 PROCEDURE — 85018 HEMOGLOBIN: CPT

## 2024-11-08 RX ADMIN — ACETAMINOPHEN 1000 MG: 500 TABLET ORAL at 00:18

## 2024-11-08 RX ADMIN — KETOROLAC TROMETHAMINE 30 MG: 30 INJECTION, SOLUTION INTRAMUSCULAR at 15:36

## 2024-11-08 RX ADMIN — SODIUM CHLORIDE, PRESERVATIVE FREE 10 ML: 5 INJECTION INTRAVENOUS at 20:31

## 2024-11-08 RX ADMIN — CEFAZOLIN 2000 MG: 2 INJECTION, POWDER, FOR SOLUTION INTRAMUSCULAR; INTRAVENOUS at 06:12

## 2024-11-08 RX ADMIN — ASPIRIN 325 MG: 325 TABLET, COATED ORAL at 20:31

## 2024-11-08 RX ADMIN — ASPIRIN 325 MG: 325 TABLET, COATED ORAL at 08:47

## 2024-11-08 RX ADMIN — ACETAMINOPHEN 1000 MG: 500 TABLET ORAL at 12:02

## 2024-11-08 RX ADMIN — ACETAMINOPHEN 1000 MG: 500 TABLET ORAL at 06:12

## 2024-11-08 RX ADMIN — KETOROLAC TROMETHAMINE 30 MG: 30 INJECTION, SOLUTION INTRAMUSCULAR at 08:46

## 2024-11-08 RX ADMIN — ENOXAPARIN SODIUM 40 MG: 100 INJECTION SUBCUTANEOUS at 08:46

## 2024-11-08 RX ADMIN — SODIUM CHLORIDE, PRESERVATIVE FREE 10 ML: 5 INJECTION INTRAVENOUS at 08:47

## 2024-11-08 RX ADMIN — ACETAMINOPHEN 1000 MG: 500 TABLET ORAL at 17:31

## 2024-11-08 ASSESSMENT — PAIN DESCRIPTION - ORIENTATION: ORIENTATION: LEFT

## 2024-11-08 ASSESSMENT — PAIN DESCRIPTION - LOCATION: LOCATION: HIP

## 2024-11-08 ASSESSMENT — PAIN SCALES - GENERAL: PAINLEVEL_OUTOF10: 5

## 2024-11-08 NOTE — PLAN OF CARE
Problem: Safety - Adult  Goal: Free from fall injury  Outcome: Progressing     Problem: Pain  Goal: Verbalizes/displays adequate comfort level or baseline comfort level  Outcome: Progressing     Problem: Neurosensory - Adult  Goal: Achieves stable or improved neurological status  Outcome: Progressing  Goal: Achieves maximal functionality and self care  Outcome: Progressing     Problem: Respiratory - Adult  Goal: Achieves optimal ventilation and oxygenation  Outcome: Progressing     Problem: Skin/Tissue Integrity - Adult  Goal: Skin integrity remains intact  Outcome: Progressing  Goal: Incisions, wounds, or drain sites healing without S/S of infection  Outcome: Progressing     Problem: Musculoskeletal - Adult  Goal: Return mobility to safest level of function  Outcome: Progressing  Goal: Maintain proper alignment of affected body part  Outcome: Progressing  Goal: Return ADL status to a safe level of function  Outcome: Progressing

## 2024-11-08 NOTE — CARE COORDINATION
Case Management Assessment  Initial Evaluation    Date/Time of Evaluation: 11/8/2024 4:09 PM  Assessment Completed by: BERE Gonzalez    If patient is discharged prior to next notation, then this note serves as note for discharge by case management.    Patient Name: Taras Benson                   YOB: 1960  Diagnosis: Comminuted fracture of hip, left, closed, initial encounter ([facility]) [S72.092A]  Closed comminuted fracture of hip, left, initial encounter ([facility]) [S72.092A]                   Date / Time: 11/6/2024  1:01 PM    Patient Admission Status: Inpatient   Readmission Risk (Low < 19, Mod (19-27), High > 27): Readmission Risk Score: 5.4    Current PCP: No primary care provider on file.  PCP verified by CM? Yes    Chart Reviewed: Yes      History Provided by: Patient  Patient Orientation: Alert and Oriented    Patient Cognition: Alert    Hospitalization in the last 30 days (Readmission):  No    If yes, Readmission Assessment in CM Navigator will be completed.    Advance Directives:      Code Status: Full Code   Patient's Primary Decision Maker is: Legal Next of Kin    Primary Decision Maker: Edgardo Benson - Child - [phone]    Discharge Planning:    Patient lives with: Alone Type of Home: House  Primary Care Giver: Self  Patient Support Systems include: Family Members, Children   Current Financial resources:    Current community resources:    [facility]: [facility]            Current DME:              Type of Home Care services:  PT, OT    ADLS  Prior functional level: Independent in ADLs/IADLs  Current functional level: Assistance with the following:    PT AM-PAC: 10 /24  OT AM-PAC: 13 /24    Family can provide assistance at DC: No  Would you like Case Management to discuss the discharge plan with any other family members/significant others, and if so, who? No  Plans to Return to Present Housing: Unknown at present  Other Identified Issues/Barriers to RETURNING to

## 2024-11-08 NOTE — ACP (ADVANCE CARE PLANNING)
Advance Care Planning   Healthcare Decision Maker:    Primary Decision Maker: Edgardo Benson - [facility] - [phone]      Today we documented Decision Maker(s) consistent with Legal Next of Kin hierarchy.

## 2024-11-09 PROCEDURE — 97116 GAIT TRAINING THERAPY: CPT

## 2024-11-09 PROCEDURE — 1200000000 HC SEMI PRIVATE

## 2024-11-09 PROCEDURE — 6370000000 HC RX 637 (ALT 250 FOR IP): Performed by: FAMILY MEDICINE

## 2024-11-09 PROCEDURE — 97530 THERAPEUTIC ACTIVITIES: CPT

## 2024-11-09 PROCEDURE — 6360000002 HC RX W HCPCS: Performed by: INTERNAL MEDICINE

## 2024-11-09 PROCEDURE — 6370000000 HC RX 637 (ALT 250 FOR IP): Performed by: INTERNAL MEDICINE

## 2024-11-09 PROCEDURE — 97110 THERAPEUTIC EXERCISES: CPT

## 2024-11-09 PROCEDURE — 2580000003 HC RX 258

## 2024-11-09 PROCEDURE — 6370000000 HC RX 637 (ALT 250 FOR IP)

## 2024-11-09 RX ORDER — IBUPROFEN 400 MG/1
400 TABLET, FILM COATED ORAL EVERY 6 HOURS PRN
Status: DISCONTINUED | OUTPATIENT
Start: 2024-11-09 | End: 2024-11-11 | Stop reason: HOSPADM

## 2024-11-09 RX ADMIN — SODIUM CHLORIDE, PRESERVATIVE FREE 10 ML: 5 INJECTION INTRAVENOUS at 21:15

## 2024-11-09 RX ADMIN — IBUPROFEN 400 MG: 400 TABLET, FILM COATED ORAL at 21:37

## 2024-11-09 RX ADMIN — KETOROLAC TROMETHAMINE 30 MG: 30 INJECTION, SOLUTION INTRAMUSCULAR at 09:59

## 2024-11-09 RX ADMIN — ACETAMINOPHEN 1000 MG: 500 TABLET ORAL at 12:15

## 2024-11-09 RX ADMIN — ENOXAPARIN SODIUM 40 MG: 100 INJECTION SUBCUTANEOUS at 09:37

## 2024-11-09 RX ADMIN — ACETAMINOPHEN 1000 MG: 500 TABLET ORAL at 01:00

## 2024-11-09 ASSESSMENT — PAIN DESCRIPTION - DESCRIPTORS
DESCRIPTORS: ACHING
DESCRIPTORS: ACHING
DESCRIPTORS: ACHING;JABBING;NAGGING
DESCRIPTORS: ACHING

## 2024-11-09 ASSESSMENT — PAIN DESCRIPTION - LOCATION
LOCATION: HIP

## 2024-11-09 ASSESSMENT — PAIN DESCRIPTION - ORIENTATION
ORIENTATION: LEFT

## 2024-11-09 ASSESSMENT — PAIN SCALES - GENERAL
PAINLEVEL_OUTOF10: 5
PAINLEVEL_OUTOF10: 4
PAINLEVEL_OUTOF10: 7
PAINLEVEL_OUTOF10: 3
PAINLEVEL_OUTOF10: 5
PAINLEVEL_OUTOF10: 5
PAINLEVEL_OUTOF10: 4

## 2024-11-10 PROCEDURE — 97530 THERAPEUTIC ACTIVITIES: CPT

## 2024-11-10 PROCEDURE — 2580000003 HC RX 258

## 2024-11-10 PROCEDURE — 6360000002 HC RX W HCPCS: Performed by: INTERNAL MEDICINE

## 2024-11-10 PROCEDURE — 97116 GAIT TRAINING THERAPY: CPT

## 2024-11-10 PROCEDURE — 1200000000 HC SEMI PRIVATE

## 2024-11-10 PROCEDURE — 6370000000 HC RX 637 (ALT 250 FOR IP): Performed by: FAMILY MEDICINE

## 2024-11-10 RX ADMIN — IBUPROFEN 400 MG: 400 TABLET, FILM COATED ORAL at 11:36

## 2024-11-10 RX ADMIN — IBUPROFEN 400 MG: 400 TABLET, FILM COATED ORAL at 20:29

## 2024-11-10 RX ADMIN — IBUPROFEN 400 MG: 400 TABLET, FILM COATED ORAL at 06:12

## 2024-11-10 RX ADMIN — ENOXAPARIN SODIUM 40 MG: 100 INJECTION SUBCUTANEOUS at 08:12

## 2024-11-10 RX ADMIN — SODIUM CHLORIDE, PRESERVATIVE FREE 10 ML: 5 INJECTION INTRAVENOUS at 20:30

## 2024-11-10 RX ADMIN — SODIUM CHLORIDE, PRESERVATIVE FREE 10 ML: 5 INJECTION INTRAVENOUS at 08:12

## 2024-11-10 ASSESSMENT — PAIN SCALES - GENERAL
PAINLEVEL_OUTOF10: 5
PAINLEVEL_OUTOF10: 3
PAINLEVEL_OUTOF10: 2
PAINLEVEL_OUTOF10: 6
PAINLEVEL_OUTOF10: 6

## 2024-11-10 ASSESSMENT — PAIN DESCRIPTION - ORIENTATION
ORIENTATION: LEFT

## 2024-11-10 ASSESSMENT — PAIN DESCRIPTION - LOCATION
LOCATION: HIP

## 2024-11-10 ASSESSMENT — PAIN DESCRIPTION - DESCRIPTORS
DESCRIPTORS: ACHING
DESCRIPTORS: ACHING;DULL;JABBING

## 2024-11-10 NOTE — PLAN OF CARE
Problem: Safety - Adult  Goal: Free from fall injury  Outcome: Progressing     Problem: Pain  Goal: Verbalizes/displays adequate comfort level or baseline comfort level  Outcome: Progressing     Problem: Neurosensory - Adult  Goal: Achieves stable or improved neurological status  Outcome: Progressing     Problem: Neurosensory - Adult  Goal: Achieves maximal functionality and self care  Outcome: Progressing     Problem: Respiratory - Adult  Goal: Achieves optimal ventilation and oxygenation  Outcome: Progressing     Problem: Skin/Tissue Integrity - Adult  Goal: Skin integrity remains intact  Outcome: Progressing     Problem: Skin/Tissue Integrity - Adult  Goal: Incisions, wounds, or drain sites healing without S/S of infection  Outcome: Progressing     Problem: Musculoskeletal - Adult  Goal: Return mobility to safest level of function  Outcome: Progressing     Problem: Musculoskeletal - Adult  Goal: Maintain proper alignment of affected body part  Outcome: Progressing     Problem: Musculoskeletal - Adult  Goal: Return ADL status to a safe level of function  Outcome: Progressing     Problem: Genitourinary - Adult  Goal: Absence of urinary retention  Outcome: Progressing

## 2024-11-11 VITALS
HEIGHT: 72 IN | RESPIRATION RATE: 17 BRPM | SYSTOLIC BLOOD PRESSURE: 112 MMHG | HEART RATE: 93 BPM | BODY MASS INDEX: 22.21 KG/M2 | OXYGEN SATURATION: 95 % | WEIGHT: 164 LBS | DIASTOLIC BLOOD PRESSURE: 65 MMHG | TEMPERATURE: 98.2 F

## 2024-11-11 PROCEDURE — 6370000000 HC RX 637 (ALT 250 FOR IP)

## 2024-11-11 PROCEDURE — 97530 THERAPEUTIC ACTIVITIES: CPT

## 2024-11-11 PROCEDURE — 6360000002 HC RX W HCPCS: Performed by: INTERNAL MEDICINE

## 2024-11-11 PROCEDURE — 2580000003 HC RX 258

## 2024-11-11 PROCEDURE — 99239 HOSP IP/OBS DSCHRG MGMT >30: CPT | Performed by: INTERNAL MEDICINE

## 2024-11-11 PROCEDURE — 97110 THERAPEUTIC EXERCISES: CPT

## 2024-11-11 PROCEDURE — 97535 SELF CARE MNGMENT TRAINING: CPT

## 2024-11-11 PROCEDURE — APPSS30 APP SPLIT SHARED TIME 16-30 MINUTES: Performed by: NURSE PRACTITIONER

## 2024-11-11 PROCEDURE — 6370000000 HC RX 637 (ALT 250 FOR IP): Performed by: FAMILY MEDICINE

## 2024-11-11 RX ADMIN — ENOXAPARIN SODIUM 40 MG: 100 INJECTION SUBCUTANEOUS at 08:15

## 2024-11-11 RX ADMIN — POLYETHYLENE GLYCOL 3350 17 G: 17 POWDER, FOR SOLUTION ORAL at 08:15

## 2024-11-11 RX ADMIN — SODIUM CHLORIDE, PRESERVATIVE FREE 10 ML: 5 INJECTION INTRAVENOUS at 08:15

## 2024-11-11 RX ADMIN — IBUPROFEN 400 MG: 400 TABLET, FILM COATED ORAL at 08:14

## 2024-11-11 ASSESSMENT — PAIN DESCRIPTION - LOCATION: LOCATION: LEG

## 2024-11-11 ASSESSMENT — PAIN DESCRIPTION - DESCRIPTORS: DESCRIPTORS: ACHING;SHARP

## 2024-11-11 ASSESSMENT — PAIN DESCRIPTION - ORIENTATION: ORIENTATION: LEFT

## 2024-11-11 ASSESSMENT — PAIN SCALES - GENERAL: PAINLEVEL_OUTOF10: 5

## 2024-11-11 NOTE — PLAN OF CARE
Problem: Safety - Adult  Goal: Free from fall injury  Outcome: Progressing     Problem: Pain  Goal: Verbalizes/displays adequate comfort level or baseline comfort level  Outcome: Progressing     Problem: Neurosensory - Adult  Goal: Achieves stable or improved neurological status  Outcome: Progressing  Goal: Achieves maximal functionality and self care  Outcome: Progressing     Problem: Respiratory - Adult  Goal: Achieves optimal ventilation and oxygenation  Outcome: Progressing     Problem: Skin/Tissue Integrity - Adult  Goal: Skin integrity remains intact  Outcome: Progressing  Goal: Incisions, wounds, or drain sites healing without S/S of infection  Outcome: Progressing     Problem: Musculoskeletal - Adult  Goal: Return mobility to safest level of function  Outcome: Progressing  Goal: Maintain proper alignment of affected body part  Outcome: Progressing  Goal: Return ADL status to a safe level of function  Outcome: Progressing     Problem: Genitourinary - Adult  Goal: Absence of urinary retention  Outcome: Progressing

## 2024-11-11 NOTE — CARE COORDINATION
11/11/2024 210p (sf) SS NOTE: SW notified by NP Karoline that pt is now refusing to transfer to rehab and states he is discharging home today. Pt's son Edgardo came to nursing desk and notified nursing & SW that pt is adamant he is leaving today. Per Edgardo, pt resides in a [address] home with bathroom near his room. SW discussed arranging DME (WW & ETB) as well as HHC. Pt's son reports they have access to a walker from a family member, therefore, they do not want to wait for the DME. SW placed call to LAURA Diez at the [facility] to attempt to arrange HHC, however, due to [facility], the [facility] office [facility] closed. Message was left, however, this SW unable to arrange any post care services. SW notified nursing and NP Karoline. Per Karoline, d/t not having a safe dc plan, they will NOT DC THE PT. She is aware pt has signed out AMA.     Electronically signed by BERE Gonzalez on 11/11/2024 at 2:18 PM

## 2024-11-11 NOTE — PLAN OF CARE
Problem: Safety - Adult  Goal: Free from fall injury  11/11/2024 0732 by Jodie Mock, RN  Outcome: Progressing  11/10/2024 2133 by Bethany Parekh, RN  Outcome: Progressing

## 2024-11-11 NOTE — PLAN OF CARE
Problem: Safety - Adult  Goal: Free from fall injury  11/11/2024 1400 by Jodie Mock, RN  Outcome: Completed  11/11/2024 0732 by Jodie Mock, RN  Outcome: Progressing

## 2024-11-11 NOTE — PROGRESS NOTES
[facility] Hospitalist   Progress Note    Admitting Date and Time: 11/6/2024  1:01 PM  Admit Dx: Comminuted fracture of hip, left, closed, initial encounter ([facility]) [S72.092A]  Closed comminuted fracture of hip, left, initial encounter ([facility]) [S72.092A]    Subjective/interval history:    11/7: Pt admitted yesterday with closed comminuted fracture of the left hip after mechanical fall.  He does not have any known significant medical history.    Patient had ORIF today without immediate complications.  He notes he is having pain, but did not like the feeling of narcotic analgesics after surgery.    11/8: Patient is sitting up in chair.  He worked with physical therapy today and needed a significant amount of assistance.  At this point he would be appropriate for subacute rehab, however he is hoping to go home with home care.    11/9: Patient feels better today, notes the swelling in his left leg has gone down.  Pain is adequately controlled with as needed Toradol.  Slight drop in hemoglobin, this is expected after surgery.  No signs of active bleeding at this time.    ROS: denies fever, chills, cp, sob, n/v, HA unless stated above.     aspirin  325 mg Oral BID    acetaminophen  1,000 mg Oral 4 times per day    enoxaparin  40 mg SubCUTAneous Daily    sodium chloride flush  5-40 mL IntraVENous 2 times per day     ketorolac, 30 mg, Q6H PRN  sodium chloride flush, 5-40 mL, PRN  sodium chloride, , PRN  magnesium sulfate, 2,000 mg, PRN  polyethylene glycol, 17 g, Daily PRN  senna, 1 tablet, Daily PRN  promethazine, 12.5 mg, Q6H PRN   Or  ondansetron, 4 mg, Q6H PRN  morphine, 2 mg, Q3H PRN  ipratropium 0.5 mg-albuterol 2.5 mg, 1 Dose, Q4H PRN         Objective:    /63   Pulse 90   Temp 98.2 °F (36.8 °C) (Oral)   Resp 16   Ht 1.828 m (5' 11.97\")   Wt 74.4 kg (164 lb)   SpO2 97%   BMI 22.26 kg/m²   General Appearance: Awake, alert, oriented x 3, not in any distress  Skin: warm and dry  Head: 
       [facility] Hospitalist   Progress Note    Admitting Date and Time: 11/6/2024  1:01 PM  Admit Dx: Comminuted fracture of hip, left, closed, initial encounter ([facility]) [S72.092A]  Closed comminuted fracture of hip, left, initial encounter ([facility]) [S72.092A]    Subjective/interval history:    11/7: Pt admitted yesterday with closed comminuted fracture of the left hip after mechanical fall.  He does not have any known significant medical history.    Patient had ORIF today without immediate complications.  He notes he is having pain, but did not like the feeling of narcotic analgesics after surgery.    11/8: Patient is sitting up in chair.  He worked with physical therapy today and needed a significant amount of assistance.  At this point he would be appropriate for subacute rehab, however he is hoping to go home with home care.    ROS: denies fever, chills, cp, sob, n/v, HA unless stated above.     aspirin  325 mg Oral BID    acetaminophen  1,000 mg Oral 4 times per day    enoxaparin  40 mg SubCUTAneous Daily    sodium chloride flush  5-40 mL IntraVENous 2 times per day     ketorolac, 30 mg, Q6H PRN  sodium chloride flush, 5-40 mL, PRN  sodium chloride, , PRN  potassium chloride, 40 mEq, PRN   Or  potassium alternative oral replacement, 40 mEq, PRN   Or  potassium chloride, 10 mEq, PRN  magnesium sulfate, 2,000 mg, PRN  polyethylene glycol, 17 g, Daily PRN  senna, 1 tablet, Daily PRN  promethazine, 12.5 mg, Q6H PRN   Or  ondansetron, 4 mg, Q6H PRN  morphine, 2 mg, Q3H PRN  ipratropium 0.5 mg-albuterol 2.5 mg, 1 Dose, Q4H PRN         Objective:    BP (!) 107/56   Pulse (!) 105   Temp 98.4 °F (36.9 °C) (Oral)   Resp 18   Ht 1.828 m (5' 11.97\")   Wt 74.4 kg (164 lb)   SpO2 96%   BMI 22.26 kg/m²   General Appearance: Awake, alert, oriented x 3, not in any distress  Skin: warm and dry  Head: normocephalic and atraumatic  Eyes: pupils equal, round, and reactive to light, extraocular eye movements intact, 
       [facility] Hospitalist   Progress Note    Admitting Date and Time: 11/6/2024  1:01 PM  Admit Dx: Comminuted fracture of hip, left, closed, initial encounter ([facility]) [S72.092A]  Closed comminuted fracture of hip, left, initial encounter ([facility]) [S72.092A]    Subjective/interval history:    Pt admitted yesterday with closed comminuted fracture of the left hip after mechanical fall.  He does not have any known significant medical history.    Patient had ORIF today without immediate complications.  He notes he is having pain, but did not like the feeling of narcotic analgesics after surgery.    ROS: denies fever, chills, cp, sob, n/v, HA unless stated above.     sodium chloride flush  5-40 mL IntraVENous 2 times per day    ketorolac  30 mg IntraVENous Once    HYDROmorphone        ketorolac        sodium chloride flush  5-40 mL IntraVENous 2 times per day     naloxone 0.4 mg in 10 mL sodium chloride syringe, , PRN  sodium chloride flush, 5-40 mL, PRN  sodium chloride, , PRN  meperidine, 12.5 mg, Q5 Min PRN  fentanNYL, 25 mcg, Q5 Min PRN  HYDROmorphone, 0.5 mg, Q5 Min PRN  prochlorperazine, 5 mg, Once PRN  droPERidol, 0.625 mg, Once PRN  midazolam, 2 mg, Once PRN  diphenhydrAMINE, 12.5 mg, Once PRN  labetalol, 10 mg, Q15 Min PRN   Or  hydrALAZINE, 10 mg, Q15 Min PRN  ipratropium 0.5 mg-albuterol 2.5 mg, 1 Dose, Once PRN  HYDROmorphone, ,   ketorolac, ,   sodium chloride flush, 5-40 mL, PRN  sodium chloride, , PRN  potassium chloride, 40 mEq, PRN   Or  potassium alternative oral replacement, 40 mEq, PRN   Or  potassium chloride, 10 mEq, PRN  magnesium sulfate, 2,000 mg, PRN  polyethylene glycol, 17 g, Daily PRN  senna, 1 tablet, Daily PRN  promethazine, 12.5 mg, Q6H PRN   Or  ondansetron, 4 mg, Q6H PRN  HYDROmorphone, 1 mg, Q3H PRN  morphine, 2 mg, Q3H PRN  ipratropium 0.5 mg-albuterol 2.5 mg, 1 Dose, Q4H PRN         Objective:    BP (!) 107/57   Pulse 84   Temp 97.6 °F (36.4 °C) (Infrared)   Resp 17   Ht 
4 Eyes Skin Assessment     NAME:  Taras Benson  YOB: 1960  MEDICAL RECORD NUMBER:  [id number]    The patient is being assessed for  Admission    I agree that at least one RN has performed a thorough Head to Toe Skin Assessment on the patient. ALL assessment sites listed below have been assessed.      Areas assessed by both nurses:    Head, Face, Ears, Shoulders, Back, Chest, Arms, Elbows, Hands, Sacrum. Buttock, Coccyx, Ischium, Legs. Feet and Heels, and Under Medical Devices         Does the Patient have a Wound? No noted wound(s)       Matthew Prevention initiated by RN: Yes  Wound Care Orders initiated by RN: No    Pressure Injury (Stage 3,4, Unstageable, DTI, NWPT, and Complex wounds) if present, place Wound referral order by RN under : No    New Ostomies, if present place, Ostomy referral order under : No     Nurse 1 eSignature: Electronically signed by Jodie Tello RN on 11/6/24 at 4:32 PM EST    **SHARE this note so that the co-signing nurse can place an eSignature**    Nurse 2 eSignature: Electronically signed by Jodie Rojas RN on 11/6/24 at 4:33 PM EST   
Department of Orthopedic Surgery  Resident Progress Note    Patient seen and examined at bedside this morning.  His pain continues to improve and he states that is generally well-controlled and that he is anxious to go home.  Patient ambulated 2 x 18 feet with use of a walker, but was unable to navigate stairs.  He denies any new onset numbness, tingling, or paresthesias.  He denies any other orthopedic complaints this time.      VITALS:  /65   Pulse 93   Temp 98.2 °F (36.8 °C) (Oral)   Resp 17   Ht 1.828 m (5' 11.97\")   Wt 74.4 kg (164 lb)   SpO2 95%   BMI 22.26 kg/m²     General: Alert and oriented x 3    MUSCULOSKELETAL:   left lower extremity:  Dressings are clean dry and intact  Compartments are soft and compressible  Sensation remains intact to sural, saphenous, tibial, peroneal nerve distributions  Motor intact ankle dorsiflexion, plantarflexion, EHL  Distal extremities warm and well-perfused  Palpable DP pulse      CBC:   Lab Results   Component Value Date/Time    WBC 8.1 11/06/2024 02:10 PM    HGB 10.9 11/08/2024 04:59 AM    HCT 32.2 11/08/2024 04:59 AM     11/06/2024 02:10 PM     PT/INR:    Lab Results   Component Value Date/Time    PROTIME 12.9 11/06/2024 03:50 PM    INR 1.2 11/06/2024 03:50 PM       ASSESSMENT  S/P reduction internal fixation of left intertrochanteric hip fracture 11/7/2024    PLAN    Continue weightbearing as tolerated to left lower extremity  Continue PT/OT  DVT prophylaxis per medicine while in house, aspirin 325 twice daily on discharge  Multimodal pain control wean as able  Per physical therapy recommendations, will be reassessed for ability to navigate stairs.  Patient is unable to navigate stairs, will discussion about discharge to short-term rehab facility.  From orthopedic perspective, he is stable for discharge.  All patient questions were sought and answered this time and patient is currently agreeable to plan.      Murali Trivedi DO, PGY1  Orthopaedic 
Department of Orthopedic Surgery  Resident Progress Note    Patient seen and examined at bedside this morning.  His pain continues to improve and he states that is generally well-controlled and that he is anxious to go home.  Patient was able to ambulate 5 sidesteps with use of wheeled walker with physical therapy yesterday.  He denies any new onset numbness, tingling, or paresthesias.  He denies any other orthopedic complaints this time.      VITALS:  /64   Pulse 93   Temp 99 °F (37.2 °C) (Oral)   Resp 14   Ht 1.828 m (5' 11.97\")   Wt 74.4 kg (164 lb)   SpO2 93%   BMI 22.26 kg/m²     General: Alert and oriented x 3    MUSCULOSKELETAL:   left lower extremity:  Dressings are clean dry and intact  Compartments are soft and compressible  Sensation remains intact to sural, saphenous, tibial, peroneal nerve distributions  Motor intact ankle dorsiflexion, plantarflexion, EHL  Distal extremities warm and well-perfused  Palpable DP pulse      CBC:   Lab Results   Component Value Date/Time    WBC 8.1 11/06/2024 02:10 PM    HGB 10.9 11/08/2024 04:59 AM    HCT 32.2 11/08/2024 04:59 AM     11/06/2024 02:10 PM     PT/INR:    Lab Results   Component Value Date/Time    PROTIME 12.9 11/06/2024 03:50 PM    INR 1.2 11/06/2024 03:50 PM       ASSESSMENT  S/P reduction internal fixation of left intertrochanteric hip fracture 11/7/2024    PLAN    Continue weightbearing as tolerated to left lower extremity  Continue PT/OT  DVT prophylaxis per medicine while in house, aspirin 325 twice daily on discharge  Multimodal pain control wean as able  Patient would like to be discharged home, patient is stable for discharge from orthopedic perspective as long as physical therapy believes he is safe for discharge      Murali Trivedi DO, PGY1  Orthopaedic Surgery  
Department of Orthopedic Surgery  Resident Progress Note    Patient seen and examined.  Patient states that he still has postoperative pain in his left lower extremity but his pain is still improved relative to before.  He denies any numbness, ting, or paresthesias.  He denies any new orthopedic complaints this time.      VITALS:  /63   Pulse 90   Temp 98.2 °F (36.8 °C) (Oral)   Resp 16   Ht 1.828 m (5' 11.97\")   Wt 74.4 kg (164 lb)   SpO2 97%   BMI 22.26 kg/m²     General: Alert and oriented x 3    MUSCULOSKELETAL:   left lower extremity:  Dressings clean dry and intact  Apartments are soft compressible  Sensation intact sural, saphenous, tibial, peroneal nerve distributions  Motor intact to ankle dorsiflexion, plantarflexion, EHL  Capillary refill <3 seconds  Distal extremities warm well-perfused      CBC:   Lab Results   Component Value Date/Time    WBC 8.1 11/06/2024 02:10 PM    HGB 10.9 11/08/2024 04:59 AM    HCT 32.2 11/08/2024 04:59 AM     11/06/2024 02:10 PM     PT/INR:    Lab Results   Component Value Date/Time    PROTIME 12.9 11/06/2024 03:50 PM    INR 1.2 11/06/2024 03:50 PM       ASSESSMENT  S/P open reduction internal fixation of left intertrochanteric hip fracture 11/7/2024    PLAN    Continue weightbearing as tolerated left lower extremity  Continue PT/OT  DVT prophylaxis per medicine while in house, aspirin 325 twice daily on discharge  Multimodal pain control, wean as tolerated  Patient is unsure whether or not he wants to be discharged home or to skilled nursing facility, will await physical therapy recommendations today      Murali Trivedi DO, PGY1  Orthopaedic Surgery  
Department of Orthopedic Surgery  Resident Progress Note    Patient seen and examined. Pain controlled. No new complaints.  Denies chest pain, shortness of breath, dizziness/lightheadedness. + Flatulence, - BM.    Patient feeling well overall this morning.  Leg feels significantly better following surgery.  I discussed postoperative rehabilitation protocol and expectations.  Patient endorsed understanding at bedside.    VITALS:  BP (!) 121/59   Pulse 97   Temp 100.2 °F (37.9 °C) (Oral)   Resp 16   Ht 1.828 m (5' 11.97\")   Wt 74.4 kg (164 lb)   SpO2 96%   BMI 22.26 kg/m²     General: alert and oriented to person, place and time, well-developed and well-nourished, in no acute distress    MUSCULOSKELETAL:   left lower extremity:  Dressing C/D/I  Compartments soft and compressible  +PF/DF/EHL  +2/4 DP & PT pulses, Brisk Cap refill, Toes warm and perfused  Distal sensation grossly intact to Peroneals, Sural, Saphenous, and tibial nrs    CBC:   Lab Results   Component Value Date/Time    WBC 8.1 11/06/2024 02:10 PM    HGB 10.9 11/08/2024 04:59 AM    HCT 32.2 11/08/2024 04:59 AM     11/06/2024 02:10 PM     PT/INR:    Lab Results   Component Value Date/Time    PROTIME 12.9 11/06/2024 03:50 PM    INR 1.2 11/06/2024 03:50 PM       ASSESSMENT  S/P ORIF left intertrochanteric hip fracture-11/7    PLAN      Continue physical therapy and protocol: WBAT - LLE  24 hour abx coverage  Deep venous thrombosis prophylaxis -per medics and while in-house, orthopedics recommends aspirin 325 mg twice daily upon discharge., early mobilization  PT/OT  Pain Control: IV and PO  Monitor H&H  D/C Plan:  Home Health vs SNF      
OCCUPATIONAL THERAPY TREATMENT NOTE    [facility]   [address] [facility]        Date:2024  Patient Name: Taras Benson  MRN: [id number]  : 1960  Room: [address] [place] [address]     Evaluating OT: Brittanie Field OTR/L; [id number]      Referring Provider and Specific Provider Orders/Date:      24   OT eval and treat  Start:  24,   End:  24,   ONE TIME,   Standing Count:  1 Occurrences,   R        Last continued at transfer on   6:05 PM    Murali Trivedi DO       Placement Recommendation: Subacute         Diagnosis:   1. Comminuted fracture of hip, left, closed, initial encounter ([facility])    2. Closed comminuted fracture of hip, left, initial encounter ([facility])         Surgery: L hip ORIF        Pertinent Medical History:       Past Medical History   History reviewed. No pertinent past medical history.         Past Surgical History         Past Surgical History:   Procedure Laterality Date    FEMUR SURGERY Left 2024     OPEN REDUCTION INTERNAL FIXATION LEFT INTERTROCHANTERIC HIP FRACTURE performed by Eron Mix DO at [facility]          Precautions:  Fall Risk, WBAT: L LE d/t ORIF of L hip      Assessment of current deficits:     [x] Functional mobility            [x]ADLs           [x] Strength                   []Cognition    [x] Functional transfers          [x] IADLs          [] Safety Awareness   [x]Endurance    [] Fine Coordination              [x] Balance      [] Vision/perception    []Sensation      []Gross Motor Coordination  [x] ROM           [] Delirium                   [] Motor Control      OT PLAN OF CARE   OT POC based on physician orders, patient diagnosis and results of clinical assessment     Frequency/Duration 1-3 days/wk for 2 weeks PRN      Specific OT Treatment Interventions to include:   * Instruction/training on adapted ADL techniques and AE recommendations to increase functional 
Physical Therapy Initial Evaluation/Plan of Care    Room #:  0325/0325-01  Patient Name: Taras Benson  YOB: 1960  MRN: [id number]    Date of Service: 11/8/2024     Tentative placement recommendation: Subacute unless patient meets goals then Home Health Physical Therapy patient wants to go home  Equipment recommendation: To be determined      Evaluating Physical Therapist: Alexa Garcia, PT #[id number]      Specific Provider Orders/Date/Referring Provider :  11/07/24 1700    PT eval and treat  Start:  11/07/24 1700,   End:  11/07/24 1700,   ONE TIME,   Standing Count:  1 Occurrences,   R       Last continued at transfer on u Nov 7, 2024  6:05 PM  Murali Trivedi DO    Admitting Diagnosis:   Comminuted fracture of hip, left, closed, initial encounter ([facility]) [S72.092A]  Closed comminuted fracture of hip, left, initial encounter ([facility]) [S72.092A]     left hip pain after falling from a ladder  Surgery:     Date of Procedure: 11/7/2024     Pre-Op Diagnosis Codes:      * Closed intertrochanteric fracture of hip, left, initial encounter ([facility]) [S72.142A]     Post-Op Diagnosis: Same       Procedure(s):  OPEN REDUCTION INTERNAL FIXATION LEFT INTERTROCHANTERIC HIP FRACTURE     Surgeon(s):  Eron Mix DO    Patient Active Problem List   Diagnosis    Comminuted fracture of hip, left, closed, initial encounter ([facility])    Fall        ASSESSMENT of Current Deficits Patient exhibits decreased strength, balance, and endurance impairing functional mobility, transfers, gait , gait distance, and tolerance to activity pain left hip, moderate assist for mobility, limited weight bearing Left lower extremity due to pain. Patient requires continued skilled physical therapy to address concerns listed above for increased safety and function at discharge.         PHYSICAL THERAPY  PLAN OF CARE       Physical therapy plan of care is established based on physician order,  patient diagnosis and clinical assessment    Current 
Physical Therapy Treatment Note/Plan of Care    Room #:  0325/0325-01  Patient Name: Taras Benson  YOB: 1960  MRN: [id number]    Date of Service: 11/9/2024     Tentative placement recommendation: Subacute unless patient meets goals then Home Health Physical Therapy patient wants to go home  Equipment recommendation: To be determined      Evaluating Physical Therapist: Alexa Garcia, PT #[id number]      Specific Provider Orders/Date/Referring Provider :  11/07/24 1700    PT eval and treat  Start:  11/07/24 1700,   End:  11/07/24 1700,   ONE TIME,   Standing Count:  1 Occurrences,   R       Last continued at transfer on u Nov 7, 2024  6:05 PM  Murali Trivedi DO    Admitting Diagnosis:   Comminuted fracture of hip, left, closed, initial encounter ([facility]) [S72.092A]  Closed comminuted fracture of hip, left, initial encounter ([facility]) [S72.092A]     left hip pain after falling from a ladder  Surgery:     Date of Procedure: 11/7/2024     Pre-Op Diagnosis Codes:      * Closed intertrochanteric fracture of hip, left, initial encounter ([facility]) [S72.142A]     Post-Op Diagnosis: Same       Procedure(s):  OPEN REDUCTION INTERNAL FIXATION LEFT INTERTROCHANTERIC HIP FRACTURE     Surgeon(s):  Eron Mix DO    Patient Active Problem List   Diagnosis    Comminuted fracture of hip, left, closed, initial encounter ([facility])    Fall        ASSESSMENT of Current Deficits Patient exhibits decreased strength, balance, and endurance impairing functional mobility, transfers, gait , gait distance, and tolerance to activity, pain left hip, moderate assist for mobility, and limited tolerance to weight bearing on left lower extremity due to pain. He declined sitting in chair today, as he states sitting makes pain worse, but was motivated to exercise and stand. He required a slow pace and guidance for proper mobility and safety. Patient requires continued skilled physical therapy to address concerns listed above for increased 
Physical Therapy Treatment Note/Plan of Care    Room #:  0325/0325-01  Patient Name: Taras Benson  YOB: 1960  MRN: [id number]    Date of Service: 11/10/2024     Tentative placement recommendation: Subacute unless patient meets goals then Home Health Physical Therapy patient wants to go home  Equipment recommendation: To be determined      Evaluating Physical Therapist: Alexa Garcia, PT #[id number]      Specific Provider Orders/Date/Referring Provider :  11/07/24 1700    PT eval and treat  Start:  11/07/24 1700,   End:  11/07/24 1700,   ONE TIME,   Standing Count:  1 Occurrences,   R       Last continued at transfer on u Nov 7, 2024  6:05 PM  Murali Trivedi DO    Admitting Diagnosis:   Comminuted fracture of hip, left, closed, initial encounter ([facility]) [S72.092A]  Closed comminuted fracture of hip, left, initial encounter ([facility]) [S72.092A]     left hip pain after falling from a ladder  Surgery:     Date of Procedure: 11/7/2024     Pre-Op Diagnosis Codes:      * Closed intertrochanteric fracture of hip, left, initial encounter ([facility]) [S72.142A]     Post-Op Diagnosis: Same       Procedure(s):  OPEN REDUCTION INTERNAL FIXATION LEFT INTERTROCHANTERIC HIP FRACTURE     Surgeon(s):  Eron Mix DO    Patient Active Problem List   Diagnosis    Comminuted fracture of hip, left, closed, initial encounter ([facility])    Fall        ASSESSMENT of Current Deficits Patient exhibits decreased strength, balance, and endurance impairing functional mobility, transfers, gait , gait distance, and tolerance to activity Patient with pain in left hip, limiting function at times. Patient with some drainage on aquacell, nursing aware. Patient able to inc ambulation distance with use of wheeled walker and moderate assist for safety. Cues for sequencing and WB through L side throughout session. Patient with difficulty sitting into chair, needing assist with kicking L foot out. Patient wants to return home vs going somewhere for 
Awake, alert, oriented x 3, not in any distress  Skin: warm and dry  Head: normocephalic and atraumatic  Eyes: extraocular eye movements intact, conjunctivae normal  Extremities: Left hip postoperative bandages in place.  Mild edema left proximal lower extremity.  No peripheral edema.  No peripheral cyanosis.  No digital clubbing.  Neurologic: no cranial nerve deficit and speech normal      Recent Labs     11/08/24  0459      K 3.9      CO2 24   BUN 16   CREATININE 0.8   GLUCOSE 115*   CALCIUM 8.5*         Recent Labs     11/08/24  0459   HGB 10.9*   HCT 32.2*     Radiology:   Fluoro For Surgical Procedures   Final Result   Intraprocedural fluoroscopic imaging, as above.  Please see separate   procedure report for more information.         XR CHEST PORTABLE   Final Result   No acute process.         XR HIP LEFT (2-3 VIEWS)   Final Result   Comminuted intratrochanteric fracture with mild varus angulation.         XR HIP 2-3 VW W PELVIS LEFT   Final Result   Comminuted left intertrochanteric fracture.             Assessment and Plan:  Principal Problem:    Comminuted fracture of hip, left, closed, initial encounter ([facility])  Active Problems:    Fall  Resolved Problems:    * No resolved hospital problems. *      Closed comminuted left hip fracture due to mechanical fall status post ORIF POD #3  -Multimodal pain control including scheduled acetaminophen, as needed NSAIDs, morphine for severe pain  -DVT prophylaxis enoxaparin while inpatient, aspirin on discharge per orthopedic service.    -Continue PT/OT    Disposition: Patient will be appropriate for subacute rehab, however his goal is to go home with home care.  Will monitor his progress with PT and OT.  He is otherwise medically ready for discharge.    NOTE: This report was transcribed using voice recognition software. Every effort was made to ensure accuracy; however, inadvertent computerized transcription errors may be present.     Electronically signed by 
down into a chair, but is able to kick out that L foot without outside assistance. Patient was able to perform stair training this session with maximal progressing to moderate assist.   Patient requires continued skilled physical therapy to address concerns listed above for increased safety and function at discharge.         PHYSICAL THERAPY  PLAN OF CARE   Physical therapy plan of care is established based on physician order,  patient diagnosis and clinical assessment    Current Treatment Recommendations:    -Bed Mobility: Lower and upper extremity exercises, and trunk control activities  -Sitting Balance: Incorporate reaching activities to activate trunk muscles   -Standing Balance: Perform strengthening exercises in standing to promote motor control with or without upper extremity support   -Transfers: Provide instruction on proper hand and foot position for adequate transfer of weight onto lower extremities and use of gait device if needed, Cues for hand placement, technique and safety. Provide stabilization to prevent fall , and Assist with extension of knees trunk and hip to accept weight transfer   -Gait: Gait training, Standing activities to improve: base of support, weight shift, weight bearing , and Pregait training to emphasize: Sequencing , Device control, Upright, and Safety   -Endurance: Utilize Supervised activities to increase level of endurance to allow for safe functional mobility including transfers and gait   -Stairs: Stair training with instruction on proper technique and hand placement on rail    PT long term treatment goals are located in below grid    Patient and or family understand(s) diagnosis, prognosis, and plan of care.    Frequency of treatments: Patient will be seen  daily.         Prior Level of Function: Patient ambulated independently, drives    Rehab Potential: good   for baseline    Past medical history:   History reviewed. No pertinent past medical history.  Past Surgical 
[id number]

## 2024-11-11 NOTE — DISCHARGE SUMMARY
PROVIDED HISTORY: Reason for exam:->Pain Reason for exam:->Traction view, will call when ready FINDINGS: There is comminuted intratrochanteric fracture with mild varus angulation. Adjacent soft tissue swelling noted.     Comminuted intratrochanteric fracture with mild varus angulation.     XR CHEST PORTABLE    Result Date: 11/6/2024  EXAMINATION: ONE XRAY VIEW OF THE CHEST 11/6/2024 5:05 pm COMPARISON: None. HISTORY: ORDERING SYSTEM PROVIDED HISTORY: pre-op TECHNOLOGIST PROVIDED HISTORY: Reason for exam:->pre-op FINDINGS: The lungs are without acute focal process.  There is no effusion or pneumothorax. The cardiomediastinal silhouette is without acute process. The osseous structures are without acute process.     No acute process.     XR HIP 2-3 VW W PELVIS LEFT    Result Date: 11/6/2024  EXAMINATION: ONE XRAY VIEW OF THE PELVIS AND TWO XRAY VIEWS LEFT HIP 11/6/2024 1:28 pm COMPARISON: None. HISTORY: ORDERING SYSTEM PROVIDED HISTORY: fall TECHNOLOGIST PROVIDED HISTORY: Reason for exam:->fall FINDINGS: There is comminuted intertrochanteric fracture.  Adjacent soft tissue swelling noted.  There are no radiopaque foreign bodies.     Comminuted left intertrochanteric fracture.         Patient Instructions:   Current Discharge Medication List        START taking these medications    Details   oxyCODONE-acetaminophen (PERCOCET) 5-325 MG per tablet Take 1 tablet by mouth every 6 hours as needed for Pain for up to 7 days. Intended supply: 7 days. Take lowest dose possible to manage pain Max Daily Amount: 4 tablets  Qty: 28 tablet, Refills: 0    Comments: Reduce doses taken as pain becomes manageable  Associated Diagnoses: Comminuted fracture of hip, left, closed, initial encounter ([facility])      aspirin 325 MG tablet Take 1 tablet by mouth 2 times daily for 28 days  Qty: 56 tablet, Refills: 0               Note  that  more then 40 minutes were spent in preparing discharge papers, discussing discharge with patient, medication
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
## 2024-12-18 ENCOUNTER — OFFICE VISIT (OUTPATIENT)
Dept: ORTHOPEDIC SURGERY | Age: 64
End: 2024-12-18

## 2024-12-18 VITALS — TEMPERATURE: 98 F | HEIGHT: 72 IN | RESPIRATION RATE: 16 BRPM | BODY MASS INDEX: 22.21 KG/M2 | WEIGHT: 164 LBS

## 2024-12-18 DIAGNOSIS — S72.092A: Primary | ICD-10-CM

## 2024-12-18 PROCEDURE — 99024 POSTOP FOLLOW-UP VISIT: CPT

## 2024-12-18 NOTE — PROGRESS NOTES
Mr. Benson returns today for follow-up of a left hip fracture which was treated with ORIF.  Date of surgery was 11/7/24.  he reports he is doing okay. He ambulates with walker and has been doing some of his own exercises. Today is his first visit seeing us after surgery.     Physical Exam:   - Skin intact with incision         Nontender to palpation at the area of the fracture site.         ROM   wnl         The incision is healing well without evidence of infection.         Pulses are intact and symmetric bilaterally         Strength dec         Sensation wnl    Xrays:None today. Patient refused imaging    Radiographic findings reviewed with patient    Impression:   Encounter Diagnosis   Name Primary?    Comminuted fracture of hip, left, closed, initial encounter (HCC) Yes         Plan:   Continue WBAT and strengthening  Patient does not want to do formal Pt  Follow up 2 months with imaging

## 2025-02-11 DIAGNOSIS — S72.092A: Primary | ICD-10-CM

## 2025-02-18 ENCOUNTER — OFFICE VISIT (OUTPATIENT)
Dept: ORTHOPEDIC SURGERY | Age: 65
End: 2025-02-18

## 2025-02-18 VITALS — BODY MASS INDEX: 22.96 KG/M2 | WEIGHT: 164 LBS | HEIGHT: 71 IN | TEMPERATURE: 98.6 F

## 2025-02-18 DIAGNOSIS — S72.092A: Primary | ICD-10-CM

## 2025-02-18 RX ORDER — TRAMADOL HYDROCHLORIDE 50 MG/1
50 TABLET ORAL EVERY 6 HOURS PRN
Qty: 28 TABLET | Refills: 0 | Status: SHIPPED | OUTPATIENT
Start: 2025-02-18 | End: 2025-02-25

## 2025-02-18 NOTE — PROGRESS NOTES
Chief Complaint   Patient presents with    Follow-up     2 month f/u for Left Hip ORIF. Patient states he is feeling a lot better. Still having some discomfort in the morning. Pain level today 7/10. Pain described as aching.        Taras Benson  Is a 64 y.o. male  is following up today with left hip pain. Patient states pain level is a 1-2/10at rest, 6/10 with wb.   He has tried  orif hip .    No past medical history on file.  Past Surgical History:   Procedure Laterality Date    FEMUR SURGERY Left 11/7/2024    OPEN REDUCTION INTERNAL FIXATION LEFT INTERTROCHANTERIC HIP FRACTURE performed by Eron Mix DO at [facility] OR       Current Outpatient Medications:     aspirin 325 MG tablet, Take 1 tablet by mouth 2 times daily for 28 days, Disp: 56 tablet, Rfl: 0  No Known Allergies  Social History     Socioeconomic History    Marital status: Single     Spouse name: Not on file    Number of children: Not on file    Years of education: Not on file    Highest education level: Not on file   Occupational History    Not on file   Tobacco Use    Smoking status: Every Day     Types: Cigars    Smokeless tobacco: Never   Vaping Use    Vaping status: Never Used   Substance and Sexual Activity    Alcohol use: Not Currently    Drug use: Not on file    Sexual activity: Not on file   Other Topics Concern    Not on file   Social History Narrative    Not on file     Social Determinants of Health     Financial Resource Strain: Not on file   Food Insecurity: No Food Insecurity (11/6/2024)    Hunger Vital Sign     Worried About Running Out of Food in the Last Year: Never true     Ran Out of Food in the Last Year: Never true   Transportation Needs: Unmet Transportation Needs (11/6/2024)    PRAPARE - Transportation     Lack of Transportation (Medical): Yes     Lack of Transportation (Non-Medical): Yes   Physical Activity: Not on file   Stress: Not on file   Social Connections: Not on file   Intimate Partner Violence: Not on file

## 2025-04-15 DIAGNOSIS — S72.092A: Primary | ICD-10-CM

## (undated) DEVICE — DRAPE C ARM W41XL65IN UNIV W/ CLP AND RUBBERBAND

## (undated) DEVICE — SYRINGE IRRIG 60ML SFT PLIABLE BLB EZ TO GRP 1 HND USE W/

## (undated) DEVICE — APPLICATOR MEDICATED 26 CC SOLUTION HI LT ORNG CHLORAPREP

## (undated) DEVICE — SOLUTION IRRIG 1000ML 0.9% SOD CHL USP POUR PLAS BTL

## (undated) DEVICE — FREEHAND DRILL

## (undated) DEVICE — TOWEL,OR,DSP,ST,BLUE,STD,6/PK,12PK/CS: Brand: MEDLINE

## (undated) DEVICE — WIPES SKIN CLOTH READYPREP 9 X 10.5 IN 2% CHLORHEX GLUCONATE CHG PREOP

## (undated) DEVICE — GLOVE ORTHO 8   MSG9480

## (undated) DEVICE — BLADE,STAINLESS-STEEL,10,STRL,DISPOSABLE: Brand: MEDLINE

## (undated) DEVICE — 3M™ STERI-DRAPE™ U-DRAPE 1015: Brand: STERI-DRAPE™

## (undated) DEVICE — ELECTRODE PT RET AD L9FT HI MOIST COND ADH HYDRGEL CORDED

## (undated) DEVICE — GLOVE SURG SZ 7 L12IN FNGR THK79MIL GRN LTX FREE

## (undated) DEVICE — NDL CNTR 40CT FM MAG: Brand: MEDLINE INDUSTRIES, INC.

## (undated) DEVICE — COVER,LIGHT HANDLE,FLX,1/PK: Brand: MEDLINE INDUSTRIES, INC.

## (undated) DEVICE — GUIDE WIRE, BALL-TIPPED, STERILE

## (undated) DEVICE — REAMER SHAFT, MOD.TRINKLE: Brand: BIXCUT

## (undated) DEVICE — K-WIRE

## (undated) DEVICE — YANKAUER,BULB TIP,W/O VENT,RIGID,STERILE: Brand: MEDLINE

## (undated) DEVICE — BANDAGE COMPR FOAM 5 YDX6 IN TAN STRL COFLX

## (undated) DEVICE — PADDING,UNDERCAST,COTTON, 4"X4YD STERILE: Brand: MEDLINE

## (undated) DEVICE — Device

## (undated) DEVICE — GAUZE,SPONGE,4"X4",16PLY,STRL,LF,10/TRAY: Brand: MEDLINE

## (undated) DEVICE — GOWN,SIRUS,POLYRNF,RAGLAN,XL,ST,30/CS: Brand: MEDLINE

## (undated) DEVICE — 3M™ IOBAN™ 2 ANTIMICROBIAL INCISE DRAPE 6640EZ: Brand: IOBAN™ 2

## (undated) DEVICE — SURGICAL PROCEDURE PACK ORTH IV ECLIPSE STRL

## (undated) DEVICE — SPONGE LAP W18XL18IN WHT COT 4 PLY FLD STRUNG RADPQ DISP ST 2 PER PACK

## (undated) DEVICE — K-WIRE
Type: IMPLANTABLE DEVICE | Site: HIP | Status: NON-FUNCTIONAL
Brand: GAMMA
Removed: 2024-11-07

## (undated) DEVICE — MARKER,SKIN,WI/RULER AND LABELS: Brand: MEDLINE

## (undated) DEVICE — PAD,ABDOMINAL,5"X9",ST,LF,25/BX: Brand: MEDLINE INDUSTRIES, INC.

## (undated) DEVICE — DRESSING GZ W1XL8IN COT XRFRM N ADH OVERWRAP CURAD

## (undated) DEVICE — SHEET,DRAPE,70X100,STERILE: Brand: MEDLINE

## (undated) DEVICE — 3M™ IOBAN™ 2 ANTIMICROBIAL INCISE DRAPE 6651EZ: Brand: IOBAN™ 2

## (undated) DEVICE — BASIC DOUBLE BASIN 2-LF: Brand: MEDLINE INDUSTRIES, INC.

## (undated) DEVICE — PRECISION PIN TAPERED: Brand: GAMMA

## (undated) DEVICE — 3M™ IOBAN™ 2 ANTIMICROBIAL INCISE DRAPE 6650EZ: Brand: IOBAN™ 2

## (undated) DEVICE — GLOVE ORANGE PI 8   MSG9080

## (undated) DEVICE — 4-PORT MANIFOLD: Brand: NEPTUNE 2